# Patient Record
Sex: MALE | Race: WHITE | NOT HISPANIC OR LATINO
[De-identification: names, ages, dates, MRNs, and addresses within clinical notes are randomized per-mention and may not be internally consistent; named-entity substitution may affect disease eponyms.]

---

## 2017-02-15 ENCOUNTER — TRANSCRIPTION ENCOUNTER (OUTPATIENT)
Age: 37
End: 2017-02-15

## 2017-11-06 ENCOUNTER — APPOINTMENT (OUTPATIENT)
Dept: RADIOLOGY | Facility: CLINIC | Age: 37
End: 2017-11-06

## 2017-11-06 ENCOUNTER — OUTPATIENT (OUTPATIENT)
Dept: OUTPATIENT SERVICES | Facility: HOSPITAL | Age: 37
LOS: 1 days | End: 2017-11-06
Payer: COMMERCIAL

## 2017-11-06 DIAGNOSIS — Z00.8 ENCOUNTER FOR OTHER GENERAL EXAMINATION: ICD-10-CM

## 2017-11-06 PROCEDURE — 71020: CPT | Mod: 26

## 2017-11-06 PROCEDURE — 73030 X-RAY EXAM OF SHOULDER: CPT | Mod: 26,RT

## 2017-11-06 PROCEDURE — 73030 X-RAY EXAM OF SHOULDER: CPT

## 2017-11-06 PROCEDURE — 71046 X-RAY EXAM CHEST 2 VIEWS: CPT

## 2017-11-10 DIAGNOSIS — R07.9 CHEST PAIN, UNSPECIFIED: ICD-10-CM

## 2017-11-10 DIAGNOSIS — F17.200 NICOTINE DEPENDENCE, UNSPECIFIED, UNCOMPLICATED: ICD-10-CM

## 2017-11-10 DIAGNOSIS — M25.511 PAIN IN RIGHT SHOULDER: ICD-10-CM

## 2017-12-30 ENCOUNTER — OUTPATIENT (OUTPATIENT)
Dept: OUTPATIENT SERVICES | Facility: HOSPITAL | Age: 37
LOS: 1 days | End: 2017-12-30
Payer: COMMERCIAL

## 2017-12-30 ENCOUNTER — APPOINTMENT (OUTPATIENT)
Dept: MRI IMAGING | Facility: CLINIC | Age: 37
End: 2017-12-30
Payer: COMMERCIAL

## 2017-12-30 DIAGNOSIS — Z00.00 ENCOUNTER FOR GENERAL ADULT MEDICAL EXAMINATION WITHOUT ABNORMAL FINDINGS: ICD-10-CM

## 2017-12-30 PROCEDURE — 73221 MRI JOINT UPR EXTREM W/O DYE: CPT

## 2017-12-30 PROCEDURE — 73221 MRI JOINT UPR EXTREM W/O DYE: CPT | Mod: 26,RT

## 2018-09-28 ENCOUNTER — TRANSCRIPTION ENCOUNTER (OUTPATIENT)
Age: 38
End: 2018-09-28

## 2019-11-15 ENCOUNTER — TRANSCRIPTION ENCOUNTER (OUTPATIENT)
Age: 39
End: 2019-11-15

## 2021-04-26 ENCOUNTER — APPOINTMENT (OUTPATIENT)
Dept: ULTRASOUND IMAGING | Facility: CLINIC | Age: 41
End: 2021-04-26
Payer: COMMERCIAL

## 2021-04-26 ENCOUNTER — OUTPATIENT (OUTPATIENT)
Dept: OUTPATIENT SERVICES | Facility: HOSPITAL | Age: 41
LOS: 1 days | End: 2021-04-26

## 2021-04-26 DIAGNOSIS — Z00.00 ENCOUNTER FOR GENERAL ADULT MEDICAL EXAMINATION WITHOUT ABNORMAL FINDINGS: ICD-10-CM

## 2021-04-26 PROCEDURE — 76770 US EXAM ABDO BACK WALL COMP: CPT | Mod: 26

## 2022-02-14 ENCOUNTER — TRANSCRIPTION ENCOUNTER (OUTPATIENT)
Age: 42
End: 2022-02-14

## 2022-02-15 ENCOUNTER — APPOINTMENT (OUTPATIENT)
Dept: ULTRASOUND IMAGING | Facility: CLINIC | Age: 42
End: 2022-02-15
Payer: COMMERCIAL

## 2022-02-15 ENCOUNTER — OUTPATIENT (OUTPATIENT)
Dept: OUTPATIENT SERVICES | Facility: HOSPITAL | Age: 42
LOS: 1 days | End: 2022-02-15
Payer: COMMERCIAL

## 2022-02-15 DIAGNOSIS — Z00.8 ENCOUNTER FOR OTHER GENERAL EXAMINATION: ICD-10-CM

## 2022-02-15 PROCEDURE — 76882 US LMTD JT/FCL EVL NVASC XTR: CPT | Mod: 26,LT

## 2022-02-15 PROCEDURE — 76882 US LMTD JT/FCL EVL NVASC XTR: CPT

## 2022-03-02 ENCOUNTER — APPOINTMENT (OUTPATIENT)
Dept: ORTHOPEDIC SURGERY | Facility: CLINIC | Age: 42
End: 2022-03-02
Payer: COMMERCIAL

## 2022-03-02 VITALS
SYSTOLIC BLOOD PRESSURE: 142 MMHG | HEIGHT: 71 IN | DIASTOLIC BLOOD PRESSURE: 86 MMHG | HEART RATE: 90 BPM | WEIGHT: 204 LBS | BODY MASS INDEX: 28.56 KG/M2

## 2022-03-02 DIAGNOSIS — M71.20 SYNOVIAL CYST OF POPLITEAL SPACE [BAKER], UNSPECIFIED KNEE: ICD-10-CM

## 2022-03-02 DIAGNOSIS — S83.209A UNSPECIFIED TEAR OF UNSPECIFIED MENISCUS, CURRENT INJURY, UNSPECIFIED KNEE, INITIAL ENCOUNTER: ICD-10-CM

## 2022-03-02 PROCEDURE — 99203 OFFICE O/P NEW LOW 30 MIN: CPT | Mod: 25

## 2022-03-02 PROCEDURE — 20610 DRAIN/INJ JOINT/BURSA W/O US: CPT | Mod: LT

## 2022-03-02 PROCEDURE — 73564 X-RAY EXAM KNEE 4 OR MORE: CPT | Mod: LT

## 2022-03-02 NOTE — DISCUSSION/SUMMARY
[Medication Risks Reviewed] : Medication risks reviewed [Surgical risks reviewed] : Surgical risks reviewed [de-identified] : Patient is a 42-year-old male with a likely meniscus tear in the lateral aspect of his left knee as well as a Baker's cyst.  I recommended conservative treatment at this time.  I gave him injection of cortisone which he tolerated well.  I have also recommended physical therapy.  I have given a prescription for meloxicam 15 mg daily as needed for pain.  I will see him back in 2 months for repeat evaluation.  If no improvement in symptoms we will consider an MRI at that time.  We did discuss the possibility of possible surgical intervention in the future.

## 2022-03-02 NOTE — PHYSICAL EXAM
[de-identified] : GENERAL APPEARANCE: Well nourished and hydrated, pleasant, alert, and oriented x 3. Appears their stated age. \par HEENT: Normocephalic, extraocular eye motion intact. Nasal septum midline. Oral cavity clear. External auditory canal clear. \par RESPIRATORY: Breath sounds clear and audible in all lobes. No wheezing, No accessory muscle use.\par CARDIOVASCULAR: No apparent abnormalities. No lower leg edema. No varicosities. Pedal pulses are palpable.\par NEUROLOGIC: Sensation is normal, no muscle weakness in the upper or lower extremities.\par DERMATOLOGIC: No apparent skin lesions, moist, warm, no rash.\par SPINE: Cervical spine appears normal and moves freely; thoracic spine appears normal and moves freely; lumbosacral spine appears normal and moves freely, normal, nontender.\par MUSCULOSKELETAL: Hands, wrists, and elbows are normal and move freely, shoulders are normal and move freely. \par Psychiatric: Oriented to person, place, and time, insight and judgement were intact and the affect was normal. \par Musculoskeletal:. Left knee exam shows mild effusion, ROM is 0-1 30 degrees, no instability, lateral with Lpue, pain lateral joint line tenderness.  There is a 2 cm Baker's cyst present posteriorly.\par 5/5 motor strength in bilateral lower extremities. Sensory: Intact in bilateral lower extremities. DTRs: Biceps, brachioradialis, triceps, patellar, ankle and plantar 2+ and symmetric bilaterally. Pulses: dorsalis pedis, posterior tibial, femoral, popliteal, and radial 2+ and symmetric bilaterally. \par Constitutional: Alert and in no acute distress, but well-appearing.  [de-identified] : 4 views of the left knee obtained in the office today show no acute fracture or dislocation.  No significant degenerative changes noted.

## 2022-03-02 NOTE — PROCEDURE
[de-identified] : I injected his left knee.\par I discussed at length with the patient the planned steroid and lidocaine injection. The risks, benefits, convalescence and alternatives were reviewed. The possible side effects discussed included but were not limited to: pain, swelling, heat, bleeding, and redness. Symptoms are generally mild but if they are extensive then contact the office. Giving pain relievers by mouth such as NSAIDs or Tylenol can generally treat the reactions to steroid and lidocaine. Rare cases of infection have been noted. Rash, hives and itching may occur post injection. If you have muscle pain or cramps, flushing and or swelling of the face, rapid heart beat, nausea, dizziness, fever, chills, headache, difficulty breathing, swelling in the arms or legs, or have a prickly feeling of your skin, contact a health care provider immediately. Following this discussion, the knee was prepped with Alcohol and under sterile condition the 80 mg Depo-Medrol and 6 cc Lidocaine injection was performed with a 20 gauge needle through a superolateral injection site. The needle was introduced into the joint, aspiration was performed to ensure intra-articular placement and the medication was injected. Upon withdrawal of the needle the site was cleaned with alcohol and a band aid applied. The patient tolerated the injection well and there were no adverse effects. Post injection instructions included no strenuous activity for 24 hours, cryotherapy and if there are any adverse effects to contact the office.\par

## 2022-03-02 NOTE — CONSULT LETTER
[Dear  ___] : Dear  [unfilled], [Consult Letter:] : I had the pleasure of evaluating your patient, [unfilled]. [( Thank you for referring [unfilled] for consultation for _____ )] : Thank you for referring [unfilled] for consultation for [unfilled] [Please see my note below.] : Please see my note below. [Consult Closing:] : Thank you very much for allowing me to participate in the care of this patient.  If you have any questions, please do not hesitate to contact me. [Sincerely,] : Sincerely, [FreeTextEntry2] : ELLY HARDEN\par  [FreeTextEntry3] : Neil Hampton MD\par Orthopaedic Surgery\par Primary/Revision Hip & Knee Orthoplasty\par [facility] [place]\par \par [facility] \par [address] \par [address] \[place] [address]\par \par Tel [phone]\par Fax [phone]\par \par For same day and next day orthopedic appointments contact:\par [email or web address] |[phone] Appointments available nights and weekends!  \par \par [facility] [place]\par Visit us at [email or web address]\par

## 2022-03-02 NOTE — HISTORY OF PRESENT ILLNESS
[Pain Location] : pain [] : left knee [Worsening] : worsening [Intermit.] : ~He/She~ states the symptoms seem to be intermittent [Direct Pressure] : worsened by direct pressure [Walking] : worsened by walking [Knee Flexion] : worsened with knee flexion [Knee Extension] : worsened with knee extension [de-identified] : Mr. MCGEE is a pleasant 42 year old male who presents with Left knee pain. \par 42-year-old male presents today for evaluation of left knee pain and swelling has been going on for the past 3 weeks.  Patient states he was bowling when he began to experience pain in his left knee and then had swelling in the posterior aspect of his knee.  Woke up with a large ball in the back of his knee.  States that over the last 3 weeks it is slowly improved however he still has pain as well as residual weakness in the knee.  Complains of clicking on the lateral aspect of his knee.  Denies any buckling.  Takes NSAIDs as needed for the pain.

## 2022-03-17 ENCOUNTER — APPOINTMENT (OUTPATIENT)
Dept: ORTHOPEDIC SURGERY | Facility: CLINIC | Age: 42
End: 2022-03-17
Payer: COMMERCIAL

## 2022-03-17 VITALS
HEIGHT: 71 IN | HEART RATE: 103 BPM | SYSTOLIC BLOOD PRESSURE: 128 MMHG | DIASTOLIC BLOOD PRESSURE: 79 MMHG | WEIGHT: 204 LBS | BODY MASS INDEX: 28.56 KG/M2

## 2022-03-17 DIAGNOSIS — M89.9 DISORDER OF BONE, UNSPECIFIED: ICD-10-CM

## 2022-03-17 DIAGNOSIS — R20.2 PARESTHESIA OF SKIN: ICD-10-CM

## 2022-03-17 DIAGNOSIS — S63.639D SPRAIN OF INTERPHALANGEAL JOINT OF UNSPECIFIED FINGER, SUBSEQUENT ENCOUNTER: ICD-10-CM

## 2022-03-17 DIAGNOSIS — M79.644 PAIN IN RIGHT FINGER(S): ICD-10-CM

## 2022-03-17 PROCEDURE — 99215 OFFICE O/P EST HI 40 MIN: CPT

## 2022-03-17 PROCEDURE — 73130 X-RAY EXAM OF HAND: CPT | Mod: 50

## 2022-03-28 ENCOUNTER — EMERGENCY (EMERGENCY)
Facility: HOSPITAL | Age: 42
LOS: 1 days | Discharge: HOME | End: 2022-03-28
Attending: STUDENT IN AN ORGANIZED HEALTH CARE EDUCATION/TRAINING PROGRAM
Payer: COMMERCIAL

## 2022-03-28 VITALS
HEART RATE: 120 BPM | SYSTOLIC BLOOD PRESSURE: 164 MMHG | RESPIRATION RATE: 18 BRPM | WEIGHT: 199.96 LBS | DIASTOLIC BLOOD PRESSURE: 94 MMHG | HEIGHT: 71 IN | TEMPERATURE: 98 F | OXYGEN SATURATION: 98 %

## 2022-03-28 PROCEDURE — 73590 X-RAY EXAM OF LOWER LEG: CPT | Mod: 26,RT

## 2022-03-28 PROCEDURE — 73590 X-RAY EXAM OF LOWER LEG: CPT

## 2022-03-28 PROCEDURE — 29515 APPLICATION SHORT LEG SPLINT: CPT

## 2022-03-28 PROCEDURE — 29515 APPLICATION SHORT LEG SPLINT: CPT | Mod: RT

## 2022-03-28 PROCEDURE — 99283 EMERGENCY DEPT VISIT LOW MDM: CPT | Mod: 25

## 2022-03-28 RX ORDER — IBUPROFEN 200 MG
600 TABLET ORAL ONCE
Refills: 0 | Status: COMPLETED | OUTPATIENT
Start: 2022-03-28 | End: 2022-03-28

## 2022-03-28 RX ADMIN — Medication 600 MILLIGRAM(S): at 17:43

## 2022-03-28 NOTE — ED STATDOCS - ATTENDING CONTRIBUTION TO CARE
I have personally performed a face to face medical and diagnostic evaluation of the patient. I have discussed with and reviewed the resident's note and agree with the History, ROS, Physical Exam and MDM unless otherwise indicated. A brief summary of my personal evaluation and impression can be found below.    42yoM no PMH presents with acute onset of RLE pain while walking. No trauma, no rash or change in color. Denies weakness or loss of sensation in leg. PT has been able to ambulate but with difficulty 2/2 pain. Pain worsens with dorsiflexion.  Pt appears well on exam, tender to palpation over right calf with no edema or overlying skin changes. Intact ROM of ankle, sensation intact, good pulse and capillary refill of toes  Xray with no acute fracture. Concern for muscular strain. No suspicion for DVT. Pt was placed in splint for comfort and instructed on strict return precautions and close outpt f/u.

## 2022-03-28 NOTE — ED STATDOCS - CARE PROVIDER_API CALL
Dwayne Ramirez)  Orthopaedic Surgery  [address] [place] [address]  Phone: [phone]  Fax: [phone]  Follow Up Time:

## 2022-03-28 NOTE — ED STATDOCS - PATIENT PORTAL LINK FT
You can access the FollowMyHealth Patient Portal offered by [facility] by registering at the following website: [email or web address]. By joining [employer]’s FollowMyHealth portal, you will also be able to view your health information using other applications (apps) compatible with our system.

## 2022-03-28 NOTE — ED STATDOCS - OBJECTIVE STATEMENT
Patient is a 41yo M presenting with R lower leg pain. He states that he was walking and felt/heard a sudden pop and had extreme pain in the leg. He notes that he can walk with difficulty. Patient states he has no PMH/PSH/meds/allergies.

## 2022-03-28 NOTE — ED PROCEDURE NOTE - CPROC ED POST PROC CARE GUIDE1
Verbal/written post procedure instructions were given to patient/caregiver. Verbal/written post procedure instructions were given to patient/caregiver./Instructed patient/caregiver to follow-up with primary care physician./Elevate the injured extremity as instructed./Keep the cast/splint/dressing clean and dry.

## 2022-03-28 NOTE — ED STATDOCS - NS ED ROS FT
General: Denies fever, chills  MSK: R leg pain  Resp: Denies cough, SOB  CV: Denies CP, palpitations  GI: Denies nausea, vomiting  Neuro: Denies numbness, tingling  Skin: Denies rashes, lacerations

## 2022-03-28 NOTE — ED STATDOCS - NSFOLLOWUPINSTRUCTIONS_ED_ALL_ED_FT
The mainstay of treating orthopedic injuries is known as RICE therapy  Rest the joint  Ice the joint  Compress the joint  Elevate the joint  Use Ibuprofen 600mg 2-3 times a day for the next 3 days  and follow up with the orthopedist

## 2022-03-28 NOTE — ED STATDOCS - CLINICAL SUMMARY MEDICAL DECISION MAKING FREE TEXT BOX
Patient presenting with possible calf rupture. Xrays showing no fractures or abnormalities. Will splint in plantarflexion, give motrin, and ortho follow up.

## 2022-03-28 NOTE — ED STATDOCS - PHYSICAL EXAMINATION
Gen: Well appearing in NAD  Head: NC/AT  Neck: Trachea midline  Resp: No distress  Ext: R calf muscle TTP, pain with dorsiflexion  Neuro: A&O appears non focal  Skin: Warm and dry as visualized  Psych: Normal affect and mood

## 2022-03-30 ENCOUNTER — NON-APPOINTMENT (OUTPATIENT)
Age: 42
End: 2022-03-30

## 2022-03-30 ENCOUNTER — APPOINTMENT (OUTPATIENT)
Dept: ORTHOPEDIC SURGERY | Facility: CLINIC | Age: 42
End: 2022-03-30
Payer: COMMERCIAL

## 2022-03-30 DIAGNOSIS — S86.811A STRAIN OF OTHER MUSCLE(S) AND TENDON(S) AT LOWER LEG LEVEL, RIGHT LEG, INITIAL ENCOUNTER: ICD-10-CM

## 2022-03-30 PROCEDURE — 97760 ORTHOTIC MGMT&TRAING 1ST ENC: CPT

## 2022-03-30 PROCEDURE — 99214 OFFICE O/P EST MOD 30 MIN: CPT

## 2022-03-30 NOTE — PHYSICAL EXAM
[de-identified] : Right leg physical exam:\par \par No erythema, warmth, rubor.  No signs of infection in the right leg.  Normal knee range of motion and ankle range of motion.  Positive pain mid belly medial gastroc muscle.  Negative Barron squeeze test.  Achilles tendon is intact.  Neurovascularly intact right lower extremity. [de-identified] : ACC: [id number] EXAM: XR TIB FIB AP LAT 2 VIEWS RT\par \par PROCEDURE DATE: 03/28/2022\par \par \par \par INTERPRETATION: Radiographs of the RIGHT tibia and fibula\par \par CLINICAL INFORMATION: RIGHT lower extremity Pain.\par \par TECHNIQUE: Frontal and lateral views of the tibia and fibula were obtained.\par \par FINDINGS: No prior studies are available for review.\par \par The tibia and fibula are intact.\par No fracture or gross osseous lytic/ blastic lesion..\par No soft tissue abnormality.\par \par IMPRESSION: No acute radiographic osseous pathology..\par \par --- End of Report ---\par \par \par \par \par \par ABHISHEK CHAPPELL MD; Attending Radiologist\par This document has been electronically signed. Mar 29 2022 8:36AM

## 2022-03-30 NOTE — HISTORY OF PRESENT ILLNESS
[FreeTextEntry1] : The patient is a 42-year-old male who presents with a right calf injury which he sustained less than 1 week ago while crossing the street.  He felt a pop in the right calf.  He presents using crutches and in a protective splint given to him by [facility] emergency department.  He denies fevers, chills, night sweats, shortness of breath.  His pain in the calf today is a 6 out of 10.  He cannot walk normally without his crutches.  No other complaints.

## 2022-03-30 NOTE — DISCUSSION/SUMMARY
[de-identified] : Assessment: Right calf strain\par \par Plan:\par 1. Physical Therapy.  Long cam boot given to wear for the next 2 to 3 weeks.  Weight-bear as tolerated.  Come out of the boot when he starts to feel better.\par 2. NSAIDs/Tylenol as needed for pain.  Aspirin given 325 mg daily for the next 2 to 3 weeks while he is in the boot to protect from DVT.\par 3. Return to normal activities as tolerated. \par 4. Continue with ice and heat therapy. \par 5. All questions answered.  Follow-up as needed. If pain persists consider advanced imaging in the future.  The patient understood the treatment plan.

## 2022-03-31 ENCOUNTER — OUTPATIENT (OUTPATIENT)
Dept: OUTPATIENT SERVICES | Facility: HOSPITAL | Age: 42
LOS: 1 days | End: 2022-03-31
Payer: COMMERCIAL

## 2022-03-31 ENCOUNTER — APPOINTMENT (OUTPATIENT)
Dept: ULTRASOUND IMAGING | Facility: CLINIC | Age: 42
End: 2022-03-31
Payer: COMMERCIAL

## 2022-03-31 DIAGNOSIS — S86.811A STRAIN OF OTHER MUSCLE(S) AND TENDON(S) AT LOWER LEG LEVEL, RIGHT LEG, INITIAL ENCOUNTER: ICD-10-CM

## 2022-03-31 PROCEDURE — 93971 EXTREMITY STUDY: CPT

## 2022-03-31 PROCEDURE — 93971 EXTREMITY STUDY: CPT | Mod: 26,RT

## 2022-04-13 ENCOUNTER — APPOINTMENT (OUTPATIENT)
Dept: ORTHOPEDIC SURGERY | Facility: CLINIC | Age: 42
End: 2022-04-13

## 2022-05-09 ENCOUNTER — APPOINTMENT (OUTPATIENT)
Dept: ULTRASOUND IMAGING | Facility: CLINIC | Age: 42
End: 2022-05-09

## 2022-05-10 ENCOUNTER — APPOINTMENT (OUTPATIENT)
Dept: VASCULAR SURGERY | Facility: CLINIC | Age: 42
End: 2022-05-10
Payer: COMMERCIAL

## 2022-05-10 VITALS
HEART RATE: 99 BPM | OXYGEN SATURATION: 95 % | RESPIRATION RATE: 15 BRPM | DIASTOLIC BLOOD PRESSURE: 85 MMHG | SYSTOLIC BLOOD PRESSURE: 130 MMHG | HEIGHT: 70 IN | BODY MASS INDEX: 27.77 KG/M2 | WEIGHT: 194 LBS | TEMPERATURE: 97.3 F

## 2022-05-10 PROCEDURE — 99203 OFFICE O/P NEW LOW 30 MIN: CPT

## 2022-05-10 PROCEDURE — 93970 EXTREMITY STUDY: CPT

## 2022-05-10 RX ORDER — MELOXICAM 15 MG/1
15 TABLET ORAL
Qty: 30 | Refills: 0 | Status: DISCONTINUED | COMMUNITY
Start: 2022-03-30 | End: 2022-05-10

## 2022-05-10 RX ORDER — MELOXICAM 15 MG/1
15 TABLET ORAL
Qty: 30 | Refills: 0 | Status: DISCONTINUED | COMMUNITY
Start: 2022-03-02 | End: 2022-05-10

## 2022-05-10 NOTE — ASSESSMENT
[FreeTextEntry1] : 41 yo male with acute occlusive left gastroc DVT and acute non-occlusive popliteal DVT. Pt recently was using immobilizer boot for right calf muscle injury. \par \par Pt counseled on results of duplex and above diagnosis.\par Will start pt on 15 mg Xarelto BID for 3 weeks. After 3 weeks take Xarelto 20 mg once daily. \par Pt counseled if he has any SOB, chest pain or difficulty breathing he should go to the ER \par RTC in 4 weeks for repeat venous duplex to monitor DVT \par \par A total of 35 minutes was spent with patient and coordinating care.\par

## 2022-05-10 NOTE — HISTORY OF PRESENT ILLNESS
[FreeTextEntry1] : New 41 yo male PMHx degenerative disc disease, HTN, HLD, presents for evaluation of right leg pain and bulging varicose veins. Pt states 4-5 days ago he noticed the right medial knee and thigh pulling sensation and large varicose veins in the calf. He injured his calf muscle a few weeks ago and was wearing an immobilizer boot for a few weeks. Pt had a right GSV RFA about 5 years ago for varicose veins. Pt denies any severe leg swelling, chest pain, SOB, difficulty breathing. He denies hx of DVT.

## 2022-05-10 NOTE — PROCEDURE
[FreeTextEntry1] : Studies: \par \par 5/10/22 BLE venous duplex: \par right: acute occlusive gastroc DVT. Acute non-occlusive popliteal DVT \par left: GSV enlarged with > 2 sec reflux. No DVT

## 2022-05-10 NOTE — PHYSICAL EXAM
[2+] : left 2+ [Alert] : alert [Oriented to Person] : oriented to person [Oriented to Place] : oriented to place [Oriented to Time] : oriented to time [Calm] : calm [Ankle Swelling (On Exam)] : present [Varicose Veins Of Lower Extremities] : present [Varicose Veins Of The Right Leg] : of the right leg [Ankle Swelling On The Right] : mild [] : not present [Skin Ulcer] : no ulcer [de-identified] : NAD. Well appearing  [FreeTextEntry1] : palpable cord in right medial thigh and knee. Right calf varicose veins noted.

## 2022-06-07 ENCOUNTER — APPOINTMENT (OUTPATIENT)
Dept: VASCULAR SURGERY | Facility: CLINIC | Age: 42
End: 2022-06-07
Payer: COMMERCIAL

## 2022-06-07 VITALS
TEMPERATURE: 97.7 F | WEIGHT: 194 LBS | OXYGEN SATURATION: 94 % | HEIGHT: 70 IN | BODY MASS INDEX: 27.77 KG/M2 | SYSTOLIC BLOOD PRESSURE: 138 MMHG | DIASTOLIC BLOOD PRESSURE: 86 MMHG | HEART RATE: 100 BPM

## 2022-06-07 PROCEDURE — 93971 EXTREMITY STUDY: CPT

## 2022-06-07 PROCEDURE — 99213 OFFICE O/P EST LOW 20 MIN: CPT

## 2022-06-07 NOTE — PROCEDURE
[FreeTextEntry1] : Studies: \par \par 5/10/22 BLE venous duplex: \par right: acute occlusive gastroc DVT. Acute non-occlusive popliteal DVT \par left: GSV enlarged with > 2 sec reflux. No DVT \par \par 6/7/22 RLE venous duplex: subacute/ chronic non-occlusive gastroc DVT. Popliteal DVT resolved. SVT in tributary vein of calf.

## 2022-06-07 NOTE — HISTORY OF PRESENT ILLNESS
[FreeTextEntry1] : 5/10/22: New 41 yo male PMHx degenerative disc disease, HTN, HLD, presents for evaluation of right leg pain and bulging varicose veins. Pt states 4-5 days ago he noticed the right medial knee and thigh pulling sensation and large varicose veins in the calf. He injured his calf muscle a few weeks ago and was wearing an immobilizer boot for a few weeks. Pt had a right GSV RFA about 5 years ago for varicose veins. Pt denies any severe leg swelling, chest pain, SOB, difficulty breathing. He denies hx of DVT. \par \par 6/7/22: Pt doing well since last visit. He has been compliant with Xarelto 20 mg once daily. He feels the pain in his right leg is generally improving. Some days he still has the pulling sensation in his right medial thigh. He denies any SOB, chest pain, fever or chills.

## 2022-06-07 NOTE — ASSESSMENT
[FreeTextEntry1] : 43 yo male with sub acute/ chronic non occlusive left gastroc DVT. Popliteal DVT has resolced Pt recently was using immobilizer boot for right calf muscle injury. \par \par Pt counseled on results of duplex and above diagnosis.\par Pt counseled to continue taking Xarelto 20 mg daily \par Pt counseled if he has any SOB, chest pain or difficulty breathing he should go to the ER \par RTC in 3 months for repeat venous duplex to monitor DVT \par \par A total of 25 minutes was spent with patient and coordinating care.\par

## 2022-06-07 NOTE — PHYSICAL EXAM
[2+] : left 2+ [Ankle Swelling (On Exam)] : present [Varicose Veins Of Lower Extremities] : present [Varicose Veins Of The Right Leg] : of the right leg [Ankle Swelling On The Right] : mild [Alert] : alert [Oriented to Person] : oriented to person [Oriented to Place] : oriented to place [Oriented to Time] : oriented to time [] : not present [Skin Ulcer] : no ulcer [Anxious] : anxious [de-identified] : NAD. Well appearing  [FreeTextEntry1] : palpable cord in right medial thigh and knee. Right calf varicose veins noted.

## 2022-09-13 ENCOUNTER — APPOINTMENT (OUTPATIENT)
Dept: VASCULAR SURGERY | Facility: CLINIC | Age: 42
End: 2022-09-13

## 2022-09-13 DIAGNOSIS — I82.561 RT CALF MUSCULAR VEIN CHRONIC EMBOLISM AND THROMBOSIS: ICD-10-CM

## 2022-09-13 PROCEDURE — 93970 EXTREMITY STUDY: CPT

## 2022-09-13 PROCEDURE — 99213 OFFICE O/P EST LOW 20 MIN: CPT

## 2022-10-12 ENCOUNTER — EMERGENCY (EMERGENCY)
Facility: HOSPITAL | Age: 42
LOS: 1 days | Discharge: LEFT BEFORE DISCHARGE | End: 2022-10-12
Attending: EMERGENCY MEDICINE
Payer: COMMERCIAL

## 2022-10-12 VITALS
HEIGHT: 71 IN | WEIGHT: 197.98 LBS | TEMPERATURE: 98 F | RESPIRATION RATE: 18 BRPM | SYSTOLIC BLOOD PRESSURE: 140 MMHG | HEART RATE: 130 BPM | DIASTOLIC BLOOD PRESSURE: 89 MMHG | OXYGEN SATURATION: 96 %

## 2022-10-12 PROCEDURE — 99283 EMERGENCY DEPT VISIT LOW MDM: CPT

## 2022-10-12 RX ORDER — AMPICILLIN SODIUM AND SULBACTAM SODIUM 250; 125 MG/ML; MG/ML
INJECTION, POWDER, FOR SUSPENSION INTRAMUSCULAR; INTRAVENOUS
Refills: 0 | Status: DISCONTINUED | OUTPATIENT
Start: 2022-10-12 | End: 2022-10-17

## 2022-10-12 RX ORDER — AMPICILLIN SODIUM AND SULBACTAM SODIUM 250; 125 MG/ML; MG/ML
3 INJECTION, POWDER, FOR SUSPENSION INTRAMUSCULAR; INTRAVENOUS ONCE
Refills: 0 | Status: DISCONTINUED | OUTPATIENT
Start: 2022-10-12 | End: 2022-10-17

## 2022-10-12 RX ORDER — AMPICILLIN SODIUM AND SULBACTAM SODIUM 250; 125 MG/ML; MG/ML
3 INJECTION, POWDER, FOR SUSPENSION INTRAMUSCULAR; INTRAVENOUS EVERY 6 HOURS
Refills: 0 | Status: DISCONTINUED | OUTPATIENT
Start: 2022-10-13 | End: 2022-10-17

## 2022-10-12 NOTE — ED PROVIDER NOTE - CLINICAL SUMMARY MEDICAL DECISION MAKING FREE TEXT BOX
42M with erythema and edema adjacent to new tattoo. 42M with erythema and edema adjacent to new tattoo. Extensive cellulitis. Ordered labs, IV abx, US to r/o dvt given recent LE dvt.   Pt states he cannot stay for workup. I had a lengthy discussion with patient, and the patient wishes to leave at this time. The patient understands that he/she is leaving against medical advice despite the risk of missing a potential serious diagnosis which may lead to injury, disability and/or death. I discussed with the patient which tests would need to be performed and what type of monitoring would be necessary for the patient as well. I was unable to convince the patient to stay for further work-up. The patient is alert and oriented and demonstrates competence in making medical decisions.   Rx for po abx sent and urged pt to FU with PCP or return to ED ASAP for further workup and treatment.

## 2022-10-12 NOTE — ED PROVIDER NOTE - OBJECTIVE STATEMENT
42M with no PMH presenting with left arm pain and swelling after new tattoo yesterday night. Pt states he has gotten all his tattoos from the same location and same artist without any reactions in the past. Pt woke up with some redness and swelling in the arm and it worsened throughout the day.   Of note, recent blood clot LLE after torn calf muscle and taken off xarelto 9/2022. 42M with no PMH presenting with left arm pain and swelling after new tattoo yesterday night. Pt states he has gotten all his tattoos from the same shop and same artist without any reactions in the past. Pt woke up with redness and swelling in the arm and it worsened throughout the day. Denies fever, chills, draining from the site.   Of note, recent DVT LLE after torn calf muscle and taken off xarelto 9/2022 when repeat imaging showed improvement.

## 2022-10-12 NOTE — ED PROVIDER NOTE - ATTENDING APP SHARED VISIT CONTRIBUTION OF CARE
pot tattoo cellulitis with erythema warmth and swelling to LUE after tattoo placed yesterday  pe as doc  agree w iv iv ab labs meds doppler to r/o dvt( hx dvt) reassess

## 2022-10-12 NOTE — ED PROVIDER NOTE - NS ED ATTENDING STATEMENT MOD
This was a shared visit with the AVTAR. I reviewed and verified the documentation and independently performed the documented:

## 2022-10-12 NOTE — ED ADULT TRIAGE NOTE - CHIEF COMPLAINT QUOTE
Ambulatory reporting getting a new tattoo yesterday and waking up with his arm to swollen, warm  to touch. Denies fevers. Took Naproxen @1800 without any relief.

## 2022-10-12 NOTE — ED PROVIDER NOTE - PATIENT PORTAL LINK FT
You can access the FollowMyHealth Patient Portal offered by [facility] by registering at the following website: [email or web address]. By joining [employer]’s FollowMyHealth portal, you will also be able to view your health information using other applications (apps) compatible with our system.

## 2022-10-13 PROCEDURE — 99282 EMERGENCY DEPT VISIT SF MDM: CPT

## 2022-11-08 ENCOUNTER — APPOINTMENT (OUTPATIENT)
Dept: VASCULAR SURGERY | Facility: CLINIC | Age: 42
End: 2022-11-08

## 2022-11-08 NOTE — HISTORY OF PRESENT ILLNESS
[FreeTextEntry1] : 5/10/22: New 41 yo male PMHx degenerative disc disease, HTN, HLD, presents for evaluation of right leg pain and bulging varicose veins. Pt states 4-5 days ago he noticed the right medial knee and thigh pulling sensation and large varicose veins in the calf. He injured his calf muscle a few weeks ago and was wearing an immobilizer boot for a few weeks. Pt had a right GSV RFA about 5 years ago for varicose veins. Pt denies any severe leg swelling, chest pain, SOB, difficulty breathing. He denies hx of DVT. \par \par 6/7/22: Pt doing well since last visit. He has been compliant with Xarelto 20 mg once daily. He feels the pain in his right leg is generally improving. Some days he still has the pulling sensation in his right medial thigh. He denies any SOB, chest pain, fever or chills. \par \par 9/13/22: Pt doing well since last visit. He has been compliant with Xarelto 20 mg once daily. His right leg has been feeling good. He has noticed his ankles bilaterally are becoming darker. He has a noticeable right calf varicose vein. He has some pain in his left medial thigh. Pt admits he has been working long hours recently ( and ). He denies any SOB, chest pain, fever or chills.

## 2022-11-08 NOTE — ASSESSMENT
[FreeTextEntry1] : 41 yo male with chronic non occlusive right gastroc DVT. Popliteal DVT has resolved. Pt recently was using immobilizer boot for right calf muscle injury. Pt also has left GSV insufficiency. Bilateral medial ankle hyperpigmentation. \par \par Pt counseled on results of duplex and above diagnosis.\par Pt counseled to continue taking Xarelto 20 mg daily \par Pt counseled if he has any SOB, chest pain or difficulty breathing he should go to the ER \par RTC in November for repeat venous duplex to monitor DVT \par \par A total of 25 minutes was spent with patient and coordinating care.\par

## 2022-11-08 NOTE — PHYSICAL EXAM
[2+] : left 2+ [Ankle Swelling (On Exam)] : present [Varicose Veins Of Lower Extremities] : present [Varicose Veins Of The Right Leg] : of the right leg [Alert] : alert [Oriented to Person] : oriented to person [Oriented to Place] : oriented to place [Oriented to Time] : oriented to time [Anxious] : anxious [] : bilaterally [Ankle Swelling On The Right] : mild [Skin Ulcer] : no ulcer [de-identified] : NAD. Well appearing  [FreeTextEntry1] : palpable cord in right medial thigh and knee. Right calf varicose veins noted. \par several spider veins noted in medial ankles bilaterally

## 2022-11-08 NOTE — PROCEDURE
[FreeTextEntry1] : Studies: \par \par 5/10/22 BLE venous duplex: \par right: acute occlusive gastroc DVT. Acute non-occlusive popliteal DVT \par left: GSV enlarged with > 2 sec reflux. No DVT \par \par 6/7/22 RLE venous duplex: subacute/ chronic non-occlusive gastroc DVT. Popliteal DVT resolved. SVT in tributary vein of calf. \par \par 9/13/22 BLE venous duplex: \par right: Chronic non-occlusive gastroc DVT. GSV enlarged below knee with > 1 sec reflux. \par left: GSV enlarged with > 0.5 sec reflux. No DVT

## 2023-11-06 ENCOUNTER — APPOINTMENT (OUTPATIENT)
Dept: ORTHOPEDIC SURGERY | Facility: CLINIC | Age: 43
End: 2023-11-06
Payer: COMMERCIAL

## 2023-11-06 VITALS
HEART RATE: 93 BPM | WEIGHT: 201 LBS | SYSTOLIC BLOOD PRESSURE: 144 MMHG | BODY MASS INDEX: 28.14 KG/M2 | DIASTOLIC BLOOD PRESSURE: 92 MMHG | HEIGHT: 71 IN

## 2023-11-06 PROCEDURE — 99213 OFFICE O/P EST LOW 20 MIN: CPT

## 2023-11-06 PROCEDURE — 73030 X-RAY EXAM OF SHOULDER: CPT | Mod: LT

## 2023-11-08 ENCOUNTER — APPOINTMENT (OUTPATIENT)
Dept: MRI IMAGING | Facility: CLINIC | Age: 43
End: 2023-11-08

## 2023-11-08 ENCOUNTER — OUTPATIENT (OUTPATIENT)
Dept: OUTPATIENT SERVICES | Facility: HOSPITAL | Age: 43
LOS: 1 days | End: 2023-11-08
Payer: COMMERCIAL

## 2023-11-08 DIAGNOSIS — M25.512 PAIN IN LEFT SHOULDER: ICD-10-CM

## 2023-11-08 PROCEDURE — 73221 MRI JOINT UPR EXTREM W/O DYE: CPT | Mod: 26,LT

## 2023-11-08 PROCEDURE — 73221 MRI JOINT UPR EXTREM W/O DYE: CPT

## 2023-11-08 PROCEDURE — 73218 MRI UPPER EXTREMITY W/O DYE: CPT | Mod: 26,LT

## 2023-11-08 PROCEDURE — 73218 MRI UPPER EXTREMITY W/O DYE: CPT

## 2023-11-09 ENCOUNTER — APPOINTMENT (OUTPATIENT)
Dept: ORTHOPEDIC SURGERY | Facility: CLINIC | Age: 43
End: 2023-11-09
Payer: COMMERCIAL

## 2023-11-09 VITALS
HEIGHT: 71 IN | WEIGHT: 200 LBS | DIASTOLIC BLOOD PRESSURE: 88 MMHG | HEART RATE: 102 BPM | BODY MASS INDEX: 28 KG/M2 | SYSTOLIC BLOOD PRESSURE: 128 MMHG

## 2023-11-09 PROCEDURE — 99214 OFFICE O/P EST MOD 30 MIN: CPT

## 2023-11-13 ENCOUNTER — OUTPATIENT (OUTPATIENT)
Dept: OUTPATIENT SERVICES | Facility: HOSPITAL | Age: 43
LOS: 1 days | End: 2023-11-13
Payer: COMMERCIAL

## 2023-11-13 VITALS
RESPIRATION RATE: 18 BRPM | TEMPERATURE: 98 F | DIASTOLIC BLOOD PRESSURE: 80 MMHG | WEIGHT: 182.98 LBS | SYSTOLIC BLOOD PRESSURE: 125 MMHG | HEIGHT: 71 IN | OXYGEN SATURATION: 98 % | HEART RATE: 73 BPM

## 2023-11-13 DIAGNOSIS — M25.512 PAIN IN LEFT SHOULDER: ICD-10-CM

## 2023-11-13 DIAGNOSIS — Z91.89 OTHER SPECIFIED PERSONAL RISK FACTORS, NOT ELSEWHERE CLASSIFIED: ICD-10-CM

## 2023-11-13 DIAGNOSIS — Z98.890 OTHER SPECIFIED POSTPROCEDURAL STATES: Chronic | ICD-10-CM

## 2023-11-13 DIAGNOSIS — F19.91 OTHER PSYCHOACTIVE SUBSTANCE USE, UNSPECIFIED, IN REMISSION: ICD-10-CM

## 2023-11-13 DIAGNOSIS — I82.401 ACUTE EMBOLISM AND THROMBOSIS OF UNSPECIFIED DEEP VEINS OF RIGHT LOWER EXTREMITY: ICD-10-CM

## 2023-11-13 DIAGNOSIS — Z01.818 ENCOUNTER FOR OTHER PREPROCEDURAL EXAMINATION: ICD-10-CM

## 2023-11-13 LAB
A1C WITH ESTIMATED AVERAGE GLUCOSE RESULT: 5.7 % — HIGH (ref 4–5.6)
A1C WITH ESTIMATED AVERAGE GLUCOSE RESULT: 5.7 % — HIGH (ref 4–5.6)
ANION GAP SERPL CALC-SCNC: 14 MMOL/L — SIGNIFICANT CHANGE UP (ref 5–17)
ANION GAP SERPL CALC-SCNC: 14 MMOL/L — SIGNIFICANT CHANGE UP (ref 5–17)
APTT BLD: 28.8 SEC — SIGNIFICANT CHANGE UP (ref 24.5–35.6)
APTT BLD: 28.8 SEC — SIGNIFICANT CHANGE UP (ref 24.5–35.6)
BASOPHILS # BLD AUTO: 0.06 K/UL — SIGNIFICANT CHANGE UP (ref 0–0.2)
BASOPHILS # BLD AUTO: 0.06 K/UL — SIGNIFICANT CHANGE UP (ref 0–0.2)
BASOPHILS NFR BLD AUTO: 1 % — SIGNIFICANT CHANGE UP (ref 0–2)
BASOPHILS NFR BLD AUTO: 1 % — SIGNIFICANT CHANGE UP (ref 0–2)
BLD GP AB SCN SERPL QL: SIGNIFICANT CHANGE UP
BLD GP AB SCN SERPL QL: SIGNIFICANT CHANGE UP
BUN SERPL-MCNC: 13.4 MG/DL — SIGNIFICANT CHANGE UP (ref 8–20)
BUN SERPL-MCNC: 13.4 MG/DL — SIGNIFICANT CHANGE UP (ref 8–20)
CALCIUM SERPL-MCNC: 9.1 MG/DL — SIGNIFICANT CHANGE UP (ref 8.4–10.5)
CALCIUM SERPL-MCNC: 9.1 MG/DL — SIGNIFICANT CHANGE UP (ref 8.4–10.5)
CHLORIDE SERPL-SCNC: 103 MMOL/L — SIGNIFICANT CHANGE UP (ref 96–108)
CHLORIDE SERPL-SCNC: 103 MMOL/L — SIGNIFICANT CHANGE UP (ref 96–108)
CO2 SERPL-SCNC: 27 MMOL/L — SIGNIFICANT CHANGE UP (ref 22–29)
CO2 SERPL-SCNC: 27 MMOL/L — SIGNIFICANT CHANGE UP (ref 22–29)
CREAT SERPL-MCNC: 0.84 MG/DL — SIGNIFICANT CHANGE UP (ref 0.5–1.3)
CREAT SERPL-MCNC: 0.84 MG/DL — SIGNIFICANT CHANGE UP (ref 0.5–1.3)
EGFR: 110 ML/MIN/1.73M2 — SIGNIFICANT CHANGE UP
EGFR: 110 ML/MIN/1.73M2 — SIGNIFICANT CHANGE UP
EOSINOPHIL # BLD AUTO: 0.33 K/UL — SIGNIFICANT CHANGE UP (ref 0–0.5)
EOSINOPHIL # BLD AUTO: 0.33 K/UL — SIGNIFICANT CHANGE UP (ref 0–0.5)
EOSINOPHIL NFR BLD AUTO: 5.6 % — SIGNIFICANT CHANGE UP (ref 0–6)
EOSINOPHIL NFR BLD AUTO: 5.6 % — SIGNIFICANT CHANGE UP (ref 0–6)
ESTIMATED AVERAGE GLUCOSE: 117 MG/DL — HIGH (ref 68–114)
ESTIMATED AVERAGE GLUCOSE: 117 MG/DL — HIGH (ref 68–114)
GLUCOSE SERPL-MCNC: 86 MG/DL — SIGNIFICANT CHANGE UP (ref 70–99)
GLUCOSE SERPL-MCNC: 86 MG/DL — SIGNIFICANT CHANGE UP (ref 70–99)
HCT VFR BLD CALC: 49.4 % — SIGNIFICANT CHANGE UP (ref 39–50)
HCT VFR BLD CALC: 49.4 % — SIGNIFICANT CHANGE UP (ref 39–50)
HGB BLD-MCNC: 15.7 G/DL — SIGNIFICANT CHANGE UP (ref 13–17)
HGB BLD-MCNC: 15.7 G/DL — SIGNIFICANT CHANGE UP (ref 13–17)
IMM GRANULOCYTES NFR BLD AUTO: 0.3 % — SIGNIFICANT CHANGE UP (ref 0–0.9)
IMM GRANULOCYTES NFR BLD AUTO: 0.3 % — SIGNIFICANT CHANGE UP (ref 0–0.9)
INR BLD: 0.85 RATIO — SIGNIFICANT CHANGE UP (ref 0.85–1.18)
INR BLD: 0.85 RATIO — SIGNIFICANT CHANGE UP (ref 0.85–1.18)
LYMPHOCYTES # BLD AUTO: 1.24 K/UL — SIGNIFICANT CHANGE UP (ref 1–3.3)
LYMPHOCYTES # BLD AUTO: 1.24 K/UL — SIGNIFICANT CHANGE UP (ref 1–3.3)
LYMPHOCYTES # BLD AUTO: 21 % — SIGNIFICANT CHANGE UP (ref 13–44)
LYMPHOCYTES # BLD AUTO: 21 % — SIGNIFICANT CHANGE UP (ref 13–44)
MCHC RBC-ENTMCNC: 29.5 PG — SIGNIFICANT CHANGE UP (ref 27–34)
MCHC RBC-ENTMCNC: 29.5 PG — SIGNIFICANT CHANGE UP (ref 27–34)
MCHC RBC-ENTMCNC: 31.8 GM/DL — LOW (ref 32–36)
MCHC RBC-ENTMCNC: 31.8 GM/DL — LOW (ref 32–36)
MCV RBC AUTO: 92.7 FL — SIGNIFICANT CHANGE UP (ref 80–100)
MCV RBC AUTO: 92.7 FL — SIGNIFICANT CHANGE UP (ref 80–100)
MONOCYTES # BLD AUTO: 0.66 K/UL — SIGNIFICANT CHANGE UP (ref 0–0.9)
MONOCYTES # BLD AUTO: 0.66 K/UL — SIGNIFICANT CHANGE UP (ref 0–0.9)
MONOCYTES NFR BLD AUTO: 11.2 % — SIGNIFICANT CHANGE UP (ref 2–14)
MONOCYTES NFR BLD AUTO: 11.2 % — SIGNIFICANT CHANGE UP (ref 2–14)
NEUTROPHILS # BLD AUTO: 3.6 K/UL — SIGNIFICANT CHANGE UP (ref 1.8–7.4)
NEUTROPHILS # BLD AUTO: 3.6 K/UL — SIGNIFICANT CHANGE UP (ref 1.8–7.4)
NEUTROPHILS NFR BLD AUTO: 60.9 % — SIGNIFICANT CHANGE UP (ref 43–77)
NEUTROPHILS NFR BLD AUTO: 60.9 % — SIGNIFICANT CHANGE UP (ref 43–77)
PLATELET # BLD AUTO: 315 K/UL — SIGNIFICANT CHANGE UP (ref 150–400)
PLATELET # BLD AUTO: 315 K/UL — SIGNIFICANT CHANGE UP (ref 150–400)
POTASSIUM SERPL-MCNC: 4.2 MMOL/L — SIGNIFICANT CHANGE UP (ref 3.5–5.3)
POTASSIUM SERPL-MCNC: 4.2 MMOL/L — SIGNIFICANT CHANGE UP (ref 3.5–5.3)
POTASSIUM SERPL-SCNC: 4.2 MMOL/L — SIGNIFICANT CHANGE UP (ref 3.5–5.3)
POTASSIUM SERPL-SCNC: 4.2 MMOL/L — SIGNIFICANT CHANGE UP (ref 3.5–5.3)
PROTHROM AB SERPL-ACNC: 9.5 SEC — SIGNIFICANT CHANGE UP (ref 9.5–13)
PROTHROM AB SERPL-ACNC: 9.5 SEC — SIGNIFICANT CHANGE UP (ref 9.5–13)
RBC # BLD: 5.33 M/UL — SIGNIFICANT CHANGE UP (ref 4.2–5.8)
RBC # BLD: 5.33 M/UL — SIGNIFICANT CHANGE UP (ref 4.2–5.8)
RBC # FLD: 12.7 % — SIGNIFICANT CHANGE UP (ref 10.3–14.5)
RBC # FLD: 12.7 % — SIGNIFICANT CHANGE UP (ref 10.3–14.5)
SODIUM SERPL-SCNC: 144 MMOL/L — SIGNIFICANT CHANGE UP (ref 135–145)
SODIUM SERPL-SCNC: 144 MMOL/L — SIGNIFICANT CHANGE UP (ref 135–145)
WBC # BLD: 5.91 K/UL — SIGNIFICANT CHANGE UP (ref 3.8–10.5)
WBC # BLD: 5.91 K/UL — SIGNIFICANT CHANGE UP (ref 3.8–10.5)
WBC # FLD AUTO: 5.91 K/UL — SIGNIFICANT CHANGE UP (ref 3.8–10.5)
WBC # FLD AUTO: 5.91 K/UL — SIGNIFICANT CHANGE UP (ref 3.8–10.5)

## 2023-11-13 PROCEDURE — G0463: CPT

## 2023-11-13 NOTE — H&P PST ADULT - ASSESSMENT
Patient educated on surgical scrub, preadmission instructions, ERP protocol, medical clearance and day of procedure medications, verbalizes understanding. Pt instructed to stop vitamins/supplements/herbal medications/ASA/NSAIDS for one week prior to surgery and discuss with PMD.     CAPRINI SCORE    AGE RELATED RISK FACTORS                                                             [ ] Age 41-60 years                                            (1 Point)  [ ] Age: 61-74 years                                           (2 Points)                 [ ] Age= 75 years                                                (3 Points)             DISEASE RELATED RISK FACTORS                                                       [ ] Edema in the lower extremities                 (1 Point)                     [ ] Varicose veins                                               (1 Point)                                 [ ] BMI > 25 Kg/m2                                            (1 Point)                                  [ ] Serious infection (ie PNA)                            (1 Point)                     [ ] Lung disease ( COPD, Emphysema)            (1 Point)                                                                          [ ] Acute myocardial infarction                         (1 Point)                  [ ] Congestive heart failure (in the previous month)  (1 Point)         [ ] Inflammatory bowel disease                            (1 Point)                  [ ] Central venous access, PICC or Port               (2 points)       (within the last month)                                                                [ ] Stroke (in the previous month)                        (5 Points)    [ ] Previous or present malignancy                       (2 points)                                                                                                                                                         HEMATOLOGY RELATED FACTORS                                                         [ ] Prior episodes of VTE                                     (3 Points)                     [ ] Positive family history for VTE                      (3 Points)                  [ ] Prothrombin 84342 A                                     (3 Points)                     [ ] Factor V Leiden                                                (3 Points)                        [ ] Lupus anticoagulants                                      (3 Points)                                                           [ ] Anticardiolipin antibodies                              (3 Points)                                                       [ ] High homocysteine in the blood                   (3 Points)                                             [ ] Other congenital or acquired thrombophilia      (3 Points)                                                [ ] Heparin induced thrombocytopenia                  (3 Points)                                        MOBILITY RELATED FACTORS  [ ] Bed rest                                                         (1 Point)  [ ] Plaster cast                                                    (2 points)  [ ] Bed bound for more than 72 hours           (2 Points)    GENDER SPECIFIC FACTORS  [ ] Pregnancy or had a baby within the last month   (1 Point)  [ ] Post-partum < 6 weeks                                   (1 Point)  [ ] Hormonal therapy  or oral contraception   (1 Point)  [ ] History of pregnancy complications              (1 point)  [ ] Unexplained or recurrent              (1 Point)    OTHER RISK FACTORS                                           (1 Point)  [ ] BMI >40, smoking, diabetes requiring insulin, chemotherapy  blood transfusions and length of surgery over 2 hours    SURGERY RELATED RISK FACTORS  [ ]  Section within the last month     (1 Point)  [ ] Minor surgery                                                  (1 Point)  [ ] Arthroscopic surgery                                       (2 Points)  [ ] Planned major surgery lasting more            (2 Points)      than 45 minutes     [ ] Elective hip or knee joint replacement       (5 points)       surgery                                                TRAUMA RELATED RISK FACTORS  [ ] Fracture of the hip, pelvis, or leg                       (5 Points)  [ ] Spinal cord injury resulting in paralysis             (5 points)       (in the previous month)    [ ] Paralysis  (less than 1 month)                             (5 Points)  [ ] Multiple Trauma within 1 month                        (5 Points)    Total Score [        ]    Caprini Score 0-2: Low Risk, NO VTE prophylaxis required for most patients, encourage ambulation  Caprini Score 3-6: Moderate Risk , pharmacologic VTE prophylaxis is indicated for most patients (in the absence of contraindications)  Caprini Score Greater than or =7: High risk, pharmocologic VTE prophylaxis indicated for most patients (in the absence of contraindications)    OPIOID RISK TOOL    JEFFREY EACH BOX THAT APPLIES AND ADD TOTALS AT THE END    FAMILY HISTORY OF SUBSTANCE ABUSE                 FEMALE         MALE                                                Alcohol                             [  ]1 pt          [  ]3pts                                               Illegal Durgs                     [  ]2 pts        [  ]3pts                                               Rx Drugs                           [  ]4 pts        [  ]4 pts    PERSONAL HISTORY OF SUBSTANCE ABUSE                                                                                          Alcohol                             [  ]3 pts       [  ]3 pts                                               Illegal Drugs                     [  ]4 pts        [  ]4 pts                                               Rx Drugs                           [  ]5 pts        [  ]5 pts    AGE BETWEEN 16-45 YEARS                                      [  ]1 pt         [  ]1 pt    HISTORY OF PREADOLESCENT   SEXUAL ABUSE                                                             [  ]3 pts        [  ]0pts    PSYCHOLOGICAL DISEASE                     ADD, OCD, Bipolar, Schizophrenia        [  ]2 pts         [  ]2 pts                      Depression                                               [  ]1 pt           [  ]1 pt           SCORING TOTAL   (add numbers and type here)              (***)                                     A score of 3 or lower indicated LOW risk for future opioid abuse  A score of 4 to 7 indicated moderate risk for future opioid abuse  A score of 8 or higher indicates a high risk for opioid abuse     Patient educated on surgical scrub, preadmission instructions, ERP protocol,  and day of procedure medications, verbalizes understanding. Pt instructed to stop vitamins/supplements/herbal medications/ASA/NSAIDS for one week prior to surgery and discuss with PMD.     CAPRINI SCORE    AGE RELATED RISK FACTORS                                                             [ ] Age 41-60 years                                            (1 Point)  [ ] Age: 61-74 years                                           (2 Points)                 [ ] Age= 75 years                                                (3 Points)             DISEASE RELATED RISK FACTORS                                                       [ ] Edema in the lower extremities                 (1 Point)                     [ ] Varicose veins                                               (1 Point)                                 [ ] BMI > 25 Kg/m2                                            (1 Point)                                  [ ] Serious infection (ie PNA)                            (1 Point)                     [ ] Lung disease ( COPD, Emphysema)            (1 Point)                                                                          [ ] Acute myocardial infarction                         (1 Point)                  [ ] Congestive heart failure (in the previous month)  (1 Point)         [ ] Inflammatory bowel disease                            (1 Point)                  [ ] Central venous access, PICC or Port               (2 points)       (within the last month)                                                                [ ] Stroke (in the previous month)                        (5 Points)    [ ] Previous or present malignancy                       (2 points)                                                                                                                                                         HEMATOLOGY RELATED FACTORS                                                         [X ] Prior episodes of VTE                                     (3 Points)                     [ ] Positive family history for VTE                      (3 Points)                  [ ] Prothrombin 86877 A                                     (3 Points)                     [ ] Factor V Leiden                                                (3 Points)                        [ ] Lupus anticoagulants                                      (3 Points)                                                           [ ] Anticardiolipin antibodies                              (3 Points)                                                       [ ] High homocysteine in the blood                   (3 Points)                                             [ ] Other congenital or acquired thrombophilia      (3 Points)                                                [ ] Heparin induced thrombocytopenia                  (3 Points)                                        MOBILITY RELATED FACTORS  [ ] Bed rest                                                         (1 Point)  [ ] Plaster cast                                                    (2 points)  [ ] Bed bound for more than 72 hours           (2 Points)    GENDER SPECIFIC FACTORS  [ ] Pregnancy or had a baby within the last month   (1 Point)  [ ] Post-partum < 6 weeks                                   (1 Point)  [ ] Hormonal therapy  or oral contraception   (1 Point)  [ ] History of pregnancy complications              (1 point)  [ ] Unexplained or recurrent              (1 Point)    OTHER RISK FACTORS                                           (1 Point)  [ ] BMI >40, smoking, diabetes requiring insulin, chemotherapy  blood transfusions and length of surgery over 2 hours    SURGERY RELATED RISK FACTORS  [ ]  Section within the last month     (1 Point)  [ ] Minor surgery                                                  (1 Point)  [ ] Arthroscopic surgery                                       (2 Points)  [ ] Planned major surgery lasting more            (2 Points)      than 45 minutes     [ ] Elective hip or knee joint replacement       (5 points)       surgery                                                TRAUMA RELATED RISK FACTORS  [ ] Fracture of the hip, pelvis, or leg                       (5 Points)  [ ] Spinal cord injury resulting in paralysis             (5 points)       (in the previous month)    [ ] Paralysis  (less than 1 month)                             (5 Points)  [ ] Multiple Trauma within 1 month                        (5 Points)    Total Score [        ]    Caprini Score 0-2: Low Risk, NO VTE prophylaxis required for most patients, encourage ambulation  Caprini Score 3-6: Moderate Risk , pharmacologic VTE prophylaxis is indicated for most patients (in the absence of contraindications)  Caprini Score Greater than or =7: High risk, pharmocologic VTE prophylaxis indicated for most patients (in the absence of contraindications)    OPIOID RISK TOOL    JEFFREY EACH BOX THAT APPLIES AND ADD TOTALS AT THE END    FAMILY HISTORY OF SUBSTANCE ABUSE                 FEMALE         MALE                                                Alcohol                             [  ]1 pt          [  ]3pts                                               Illegal Durgs                     [  ]2 pts        [  ]3pts                                               Rx Drugs                           [  ]4 pts        [  ]4 pts    PERSONAL HISTORY OF SUBSTANCE ABUSE                                                                                          Alcohol                             [  ]3 pts       [  ]3 pts                                               Illegal Drugs                     [  ]4 pts        [  ]4 pts                                               Rx Drugs                           [  ]5 pts        [  ]5 pts    AGE BETWEEN 16-45 YEARS                                      [  ]1 pt         [  ]1 pt    HISTORY OF PREADOLESCENT   SEXUAL ABUSE                                                             [  ]3 pts        [  ]0pts    PSYCHOLOGICAL DISEASE                     ADD, OCD, Bipolar, Schizophrenia        [  ]2 pts         [  ]2 pts                      Depression                                               [  ]1 pt           [  ]1 pt           SCORING TOTAL   (add numbers and type here)              (***)                                     A score of 3 or lower indicated LOW risk for future opioid abuse  A score of 4 to 7 indicated moderate risk for future opioid abuse  A score of 8 or higher indicates a high risk for opioid abuse   44 y/o male with PMH of kidney stones and DVT right leg  presents to PST with complaints of left shoulder pain. States on 23 he was lifting a heavy tire causing his bicep to snap on his left arm/shoulder. Reports intermittent pain, described as dull, 4/10 in severity, worse with movement of left arm, relieved with rest. He takes NSAIDs as needed which helps the pain. Denies fevers, chills, nausea or vomiting. Patient is scheduled for left shoulder arthroscopy, extensive debridement, open subpectoral bicep tenodesis on 23 with Dr. Byrd. Patient educated on surgical scrub, preadmission instructions, ERP protocol and day of procedure medications, verbalizes understanding. Pt instructed to stop vitamins/supplements/herbal medications/ASA/NSAIDS for one week prior to surgery and discuss with PMD.     CAPRINI SCORE    AGE RELATED RISK FACTORS                                                             [x ] Age 41-60 years                                            (1 Point)  [ ] Age: 61-74 years                                           (2 Points)                 [ ] Age= 75 years                                                (3 Points)             DISEASE RELATED RISK FACTORS                                                       [ ] Edema in the lower extremities                 (1 Point)                     [x ] Varicose veins                                               (1 Point)                                 [x ] BMI > 25 Kg/m2                                            (1 Point)                                  [ ] Serious infection (ie PNA)                            (1 Point)                     [ ] Lung disease ( COPD, Emphysema)            (1 Point)                                                                          [ ] Acute myocardial infarction                         (1 Point)                  [ ] Congestive heart failure (in the previous month)  (1 Point)         [ ] Inflammatory bowel disease                            (1 Point)                  [ ] Central venous access, PICC or Port               (2 points)       (within the last month)                                                                [ ] Stroke (in the previous month)                        (5 Points)    [ ] Previous or present malignancy                       (2 points)                                                                                                                                                         HEMATOLOGY RELATED FACTORS                                                         [X ] Prior episodes of VTE                                     (3 Points)                     [ ] Positive family history for VTE                      (3 Points)                  [ ] Prothrombin 71806 A                                     (3 Points)                     [ ] Factor V Leiden                                                (3 Points)                        [ ] Lupus anticoagulants                                      (3 Points)                                                           [ ] Anticardiolipin antibodies                              (3 Points)                                                       [ ] High homocysteine in the blood                   (3 Points)                                             [ ] Other congenital or acquired thrombophilia      (3 Points)                                                [ ] Heparin induced thrombocytopenia                  (3 Points)                                        MOBILITY RELATED FACTORS  [ ] Bed rest                                                         (1 Point)  [ ] Plaster cast                                                    (2 points)  [ ] Bed bound for more than 72 hours           (2 Points)    GENDER SPECIFIC FACTORS  [ ] Pregnancy or had a baby within the last month   (1 Point)  [ ] Post-partum < 6 weeks                                   (1 Point)  [ ] Hormonal therapy  or oral contraception   (1 Point)  [ ] History of pregnancy complications              (1 point)  [ ] Unexplained or recurrent              (1 Point)    OTHER RISK FACTORS                                           (1 Point)  [ ] BMI >40, smoking, diabetes requiring insulin, chemotherapy  blood transfusions and length of surgery over 2 hours    SURGERY RELATED RISK FACTORS  [ ]  Section within the last month     (1 Point)  [ ] Minor surgery                                                  (1 Point)  [x ] Arthroscopic surgery                                       (2 Points)  [ ] Planned major surgery lasting more            (2 Points)      than 45 minutes     [ ] Elective hip or knee joint replacement       (5 points)       surgery                                                TRAUMA RELATED RISK FACTORS  [ ] Fracture of the hip, pelvis, or leg                       (5 Points)  [ ] Spinal cord injury resulting in paralysis             (5 points)       (in the previous month)    [ ] Paralysis  (less than 1 month)                             (5 Points)  [ ] Multiple Trauma within 1 month                        (5 Points)    Total Score [    8    ]    Caprini Score 0-2: Low Risk, NO VTE prophylaxis required for most patients, encourage ambulation  Caprini Score 3-6: Moderate Risk , pharmacologic VTE prophylaxis is indicated for most patients (in the absence of contraindications)  Caprini Score Greater than or =7: High risk, pharmocologic VTE prophylaxis indicated for most patients (in the absence of contraindications)    OPIOID RISK TOOL    JEFFREY EACH BOX THAT APPLIES AND ADD TOTALS AT THE END    FAMILY HISTORY OF SUBSTANCE ABUSE                 FEMALE         MALE                                                Alcohol                             [  ]1 pt          [  ]3pts                                               Illegal Durgs                     [  ]2 pts        [  ]3pts                                               Rx Drugs                           [  ]4 pts        [  ]4 pts    PERSONAL HISTORY OF SUBSTANCE ABUSE                                                                                          Alcohol                             [  ]3 pts       [  ]3 pts                                               Illegal Drugs                     [  ]4 pts        [  ]4 pts                                               Rx Drugs                           [  ]5 pts        [  ]5 pts    AGE BETWEEN 16-45 YEARS                                      [  ]1 pt         [x ]1 pt    HISTORY OF PREADOLESCENT   SEXUAL ABUSE                                                             [  ]3 pts        [  ]0pts    PSYCHOLOGICAL DISEASE                     ADD, OCD, Bipolar, Schizophrenia        [  ]2 pts         [  ]2 pts                      Depression                                               [  ]1 pt           [  ]1 pt           SCORING TOTAL   (add numbers and type here)              (*1**)                                     A score of 3 or lower indicated LOW risk for future opioid abuse  A score of 4 to 7 indicated moderate risk for future opioid abuse  A score of 8 or higher indicates a high risk for opioid abuse

## 2023-11-13 NOTE — H&P PST ADULT - NSICDXFAMILYHX_GEN_ALL_CORE_FT
FAMILY HISTORY:  FH: HTN (hypertension)    Mother  Still living? Unknown  Family history of diabetes mellitus (DM), Age at diagnosis: Age Unknown    Grandparent  Still living? Unknown  FH: CAD (coronary artery disease), Age at diagnosis: Age Unknown  FH: melanoma, Age at diagnosis: Age Unknown

## 2023-11-13 NOTE — H&P PST ADULT - PROBLEM SELECTOR PLAN 4
hx of Cocaine use, urine drug screen ordered for DOP, made patient aware if cocaine is present in urine procedure will be canceled, patient agrees and verbalized understanding.

## 2023-11-13 NOTE — H&P PST ADULT - NSCAGESTDRUGGUILT_GEN_A_CORE_SD
Incoming fax from [employer]    Date of result 11/18/2022 9:36 am EST    INR result 3.1    Aleksandra Rutledge RN    [facility]          no

## 2023-11-13 NOTE — H&P PST ADULT - NSANTHOSAYNRD_GEN_A_CORE
History     Chief Complaint   Patient presents with     Otalgia     left ear worse then right     Conjunctivitis     HPI  Lionel Chavez is a 39 year old male who presents to  for left ear pain and possible conjunctivitis of both eyes.  He reports having cold-like symptoms that started 3 days ago.  This morning when he woke up he had crusty discharge around both eyes.  He states both eyes were red.  No light sensitivity, denies any vision changes and no pain to both eyes.  He does not wear contact lenses or glasses. He has had left ear pain that also started today and states his hearing is muffled.  He denies any drainage from left ear.  No history of surgeries to this area.  He does report chronic sinus issues particularly affecting his left side.  He also reports nasal congestion.  Denies cough, shortness of breath, chest pain, fever, chills, nausea or vomiting.  He has not tried anything for his symptoms.    Allergies:  No Known Allergies    Problem List:    There are no active problems to display for this patient.       Past Medical History:    No past medical history on file.    Past Surgical History:    No past surgical history on file.    Family History:    No family history on file.    Social History:  Marital Status:  Single [1]  Social History     Tobacco Use     Smoking status: Never Smoker   Substance Use Topics     Alcohol use: Yes     Comment: occasional     Drug use: No        Medications:    amoxicillin-clavulanate (AUGMENTIN) 875-125 MG tablet          Review of Systems   Constitutional: Negative for chills and fever.   HENT: Positive for congestion, ear pain and hearing loss. Negative for ear discharge.    Eyes: Positive for discharge and redness. Negative for photophobia, pain and visual disturbance.   Respiratory: Negative for cough and shortness of breath.    Cardiovascular: Negative for chest pain.   Gastrointestinal: Negative for nausea and vomiting.   All other systems reviewed and are  negative.      Physical Exam   BP: 145/73  Heart Rate: 93  Temp: 97.9  F (36.6  C)  Resp: 18  SpO2: 98 %      Physical Exam  Vitals signs and nursing note reviewed.   Constitutional:       Appearance: He is ill-appearing. He is not toxic-appearing.   HENT:      Right Ear: Hearing, tympanic membrane and ear canal normal. There is no impacted cerumen.      Left Ear: External ear normal. Tenderness present. There is no impacted cerumen. No mastoid tenderness. Tympanic membrane is erythematous and bulging. Tympanic membrane is not perforated.      Nose: Congestion present.      Mouth/Throat:      Mouth: Mucous membranes are moist.      Tonsils: No tonsillar exudate or tonsillar abscesses.   Eyes:      General: Lids are normal.         Right eye: No discharge.         Left eye: No discharge.      Extraocular Movements: Extraocular movements intact.      Conjunctiva/sclera:      Right eye: Right conjunctiva is not injected. No exudate or hemorrhage.     Left eye: Left conjunctiva is not injected. No exudate or hemorrhage.     Pupils: Pupils are equal, round, and reactive to light.      Comments: Visual acuity 20/40 (right) and 20/400 (left)   Neck:      Musculoskeletal: Normal range of motion and neck supple.   Cardiovascular:      Rate and Rhythm: Normal rate and regular rhythm.      Heart sounds: Normal heart sounds.   Pulmonary:      Effort: Pulmonary effort is normal.      Breath sounds: Normal breath sounds. No wheezing or rhonchi.   Lymphadenopathy:      Cervical: No cervical adenopathy.   Skin:     General: Skin is warm and dry.      Capillary Refill: Capillary refill takes less than 2 seconds.   Neurological:      Mental Status: He is alert and oriented to person, place, and time.         ED Course        Procedures               No results found for this or any previous visit (from the past 24 hour(s)).    Medications   amoxicillin-clavulanate (AUGMENTIN) 875-125 MG per tablet 1 tablet (1 tablet Oral Given  4/11/20 1904)       Assessments & Plan (with Medical Decision Making)   Acute suppurative otitis media of left ear without spontaneous rupture of tympanic membrane:  Bulging erythematous left TM.  Left TM intact.  Right TM pearly gray.  No mastoid tenderness to left ear.  Patient is afebrile.  Vital signs are stable.  Respirations nonlabored with oxygen saturation 98% on room air.  Plan:  Augmentin as prescribed with first dose given in urgent care.  Take APAP/ibuprofen as needed for pain.  Can apply warm compress to the affected side of your ear.  Consider taking an oral decongestant to help with the nasal congestion.  Follow-up with your doctor in 2 weeks if no improvement in symptoms.  Return to ED/UC for worsening or concerning symptoms.    Acute conjunctivitis of both eyes:  Patient reports noticing redness to both eyes this morning.  He does report crusty type drainage to both eyes as well.  Very slight redness visualized to bilateral conjunctivae during this visit.  No discharge to both eyes.  PERRL and EOMs intact.  Visual acuity 20/40 (right); 20/400 (left)-patient denies any changes to his vision and is aware that he needs to get glasses.  He states his vision is normal.  Plan:  Offered to prescribe antibiotic drops for conjunctivitis, patient declined states he is more worried about his ear.  Discussed with patient to avoid touching eyes unnecessarily and make sure to wash hands if he touches his eyes.  Consider taking oral decongestant to help with the nasal congestion.  Drink plenty of fluids and get some rest.  Follow-up with PCP as needed if no improvement in symptoms.  Return to ED/UC for worsening or concerning symptoms.  Patient verbalized understanding.    Patient did have questions regarding whether his symptoms are consistent with covid19.  Discussed with patient that he does have viral-like symptoms but since his oxygen saturation is 98%, he is not having any trouble breathing and he is  afebrile he is stable enough to go home.  Discussed red flag symptoms for covid19 that may warrant him to come back in.  Advised him to wash hands, stay at home and avoid unnecessary contact with other people.  Patient verbalized understanding.    I have reviewed the nursing notes.    I have reviewed the findings, diagnosis, plan and need for follow up with the patient.      New Prescriptions    AMOXICILLIN-CLAVULANATE (AUGMENTIN) 875-125 MG TABLET    Take 1 tablet by mouth 2 times daily for 10 days       Final diagnoses:   Acute suppurative otitis media of left ear without spontaneous rupture of tympanic membrane   Acute conjunctivitis of both eyes       4/11/2020   [facility]     Mpofu, Prudence, CNP  04/11/20 1920       Mpofu, Prudence, CNP  04/11/20 1924     No. AMMON screening performed.  STOP BANG Legend: 0-2 = LOW Risk; 3-4 = INTERMEDIATE Risk; 5-8 = HIGH Risk

## 2023-11-13 NOTE — H&P PST ADULT - HISTORY OF PRESENT ILLNESS
42 y/o male with PMH of     Patient is scheduled for left shoulder arthroscopy, extensive debridement, open subpectoral bicep tenodesis on 11/16/23 with Dr. Byrd.  44 y/o male with PMH of kidney stones and DVT right leg 2022 presents to [facility] with complaints of left shoulder pain. States on 11/2/23 he was lifting a heavy tire causing his bicep to snap on his left arm/shoulder. Reports intermittent pain, described as dull, 4/10 in severity, worse with movement of left arm, relieved with rest. He takes NSAIDs as needed which helps the pain. Denies fevers, chills, nausea or vomiting. Patient is scheduled for left shoulder arthroscopy, extensive debridement, open subpectoral bicep tenodesis on 11/16/23 with Dr. Byrd.

## 2023-11-13 NOTE — H&P PST ADULT - PROBLEM SELECTOR PLAN 1
Patient is scheduled for left shoulder arthroscopy, extensive debridement, open subpectoral bicep tenodesis on 11/16/23 with Dr. Byrd.

## 2023-11-14 ENCOUNTER — APPOINTMENT (OUTPATIENT)
Dept: VASCULAR SURGERY | Facility: CLINIC | Age: 43
End: 2023-11-14
Payer: COMMERCIAL

## 2023-11-14 PROBLEM — N20.0 CALCULUS OF KIDNEY: Chronic | Status: ACTIVE | Noted: 2023-11-13

## 2023-11-14 PROBLEM — I82.401 ACUTE EMBOLISM AND THROMBOSIS OF UNSPECIFIED DEEP VEINS OF RIGHT LOWER EXTREMITY: Chronic | Status: ACTIVE | Noted: 2023-11-13

## 2023-11-14 PROCEDURE — 93970 EXTREMITY STUDY: CPT

## 2023-11-20 ENCOUNTER — APPOINTMENT (OUTPATIENT)
Dept: VASCULAR SURGERY | Facility: CLINIC | Age: 43
End: 2023-11-20
Payer: COMMERCIAL

## 2023-11-20 VITALS
HEART RATE: 85 BPM | DIASTOLIC BLOOD PRESSURE: 82 MMHG | TEMPERATURE: 98.2 F | SYSTOLIC BLOOD PRESSURE: 126 MMHG | WEIGHT: 195 LBS | RESPIRATION RATE: 14 BRPM | BODY MASS INDEX: 27.3 KG/M2 | HEIGHT: 71 IN | OXYGEN SATURATION: 97 %

## 2023-11-20 DIAGNOSIS — I82.431 ACUTE EMBOLISM AND THROMBOSIS OF RIGHT POPLITEAL VEIN: ICD-10-CM

## 2023-11-20 PROCEDURE — 99213 OFFICE O/P EST LOW 20 MIN: CPT

## 2023-11-20 RX ORDER — RIVAROXABAN 15 MG/1
15 TABLET, FILM COATED ORAL TWICE DAILY
Qty: 42 | Refills: 0 | Status: DISCONTINUED | COMMUNITY
Start: 2022-05-10 | End: 2023-11-20

## 2023-11-20 RX ORDER — DICLOFENAC SODIUM 1% 10 MG/G
1 GEL TOPICAL
Qty: 1 | Refills: 2 | Status: DISCONTINUED | COMMUNITY
Start: 2022-03-17 | End: 2023-11-20

## 2023-11-20 RX ORDER — RIVAROXABAN 20 MG/1
20 TABLET, FILM COATED ORAL
Qty: 90 | Refills: 3 | Status: DISCONTINUED | COMMUNITY
Start: 2022-05-10 | End: 2023-11-20

## 2023-11-20 RX ORDER — AMLODIPINE AND VALSARTAN 5; 160 MG/1; MG/1
5-160 TABLET, FILM COATED ORAL
Qty: 30 | Refills: 0 | Status: DISCONTINUED | COMMUNITY
Start: 2022-03-17 | End: 2023-11-20

## 2023-11-20 RX ORDER — VALSARTAN 160 MG/1
160 TABLET, COATED ORAL
Qty: 30 | Refills: 0 | Status: DISCONTINUED | COMMUNITY
Start: 2022-02-24 | End: 2023-11-20

## 2023-11-20 RX ORDER — OXYCODONE 5 MG/1
5 TABLET ORAL
Qty: 28 | Refills: 0 | Status: DISCONTINUED | COMMUNITY
Start: 2023-11-20 | End: 2023-11-20

## 2023-11-20 RX ORDER — ASPIRIN 325 MG/1
325 TABLET, FILM COATED ORAL DAILY
Qty: 30 | Refills: 0 | Status: DISCONTINUED | COMMUNITY
Start: 2022-03-30 | End: 2023-11-20

## 2023-11-20 RX ORDER — ROSUVASTATIN CALCIUM 10 MG/1
10 TABLET, FILM COATED ORAL
Qty: 30 | Refills: 0 | Status: DISCONTINUED | COMMUNITY
Start: 2022-03-17 | End: 2023-11-20

## 2023-11-21 ENCOUNTER — APPOINTMENT (OUTPATIENT)
Dept: ORTHOPEDIC SURGERY | Facility: AMBULATORY SURGERY CENTER | Age: 43
End: 2023-11-21
Payer: COMMERCIAL

## 2023-11-21 PROCEDURE — 23430 REPAIR BICEPS TENDON: CPT | Mod: LT

## 2023-11-21 PROCEDURE — 29822 SHO ARTHRS SRG LMTD DBRDMT: CPT | Mod: LT

## 2023-12-04 ENCOUNTER — APPOINTMENT (OUTPATIENT)
Dept: ORTHOPEDIC SURGERY | Facility: CLINIC | Age: 43
End: 2023-12-04
Payer: COMMERCIAL

## 2023-12-04 PROCEDURE — 99024 POSTOP FOLLOW-UP VISIT: CPT

## 2024-01-04 ENCOUNTER — APPOINTMENT (OUTPATIENT)
Dept: ORTHOPEDIC SURGERY | Facility: CLINIC | Age: 44
End: 2024-01-04
Payer: COMMERCIAL

## 2024-01-04 DIAGNOSIS — M25.512 PAIN IN LEFT SHOULDER: ICD-10-CM

## 2024-01-04 PROCEDURE — 99024 POSTOP FOLLOW-UP VISIT: CPT

## 2024-01-04 NOTE — PHYSICAL EXAM
[de-identified] : General: Awake, alert, no acute distress, Patient was cooperative and appropriate during the examination.  The patient is of normal weight for height and age.  Walks without an antalgic gait.  Full, painless range of motion of the neck and back.  Exam of the bilateral lower extremities is intact and symmetric with regards to dermatologic, vascular, and neurologic exam. Bilateral lower extremity sensation is grossly intact to light touch in the DP/SP/T/S/S nerve distributions. Intact DF/PF/EHL. BIlateral lower extremities warm and well-perfused with brisk capillary refill.   Pulmonary: Regular, nonlabored breathing  Abdomen: Soft, nontender, nondistended.  Lymphatic: No evidence of axillary lymphadenopathy  Left shoulder Exam: Physical exam of the shoulder demonstrates normal skin without signs of skin changes or abnormalities. No erythema, warmth, or joint effusion appreciated.  Mild soft tissue swelling.  A Carlos Enrique deformity is noted.  Sensation intact light touch C5-T1 Palpable radial pulse Radial/ulnar/median/axillary/musculocutaneous/AIN/PIN nerves grossly intact  Range of motion: Forward Flexion: 175 Abduction: 140 External Rotation: 45 Internal Rotation: L3  Palpation: Not tender to palpation over the glenohumeral joint Moderately tender palpation over the rotator cuff insertion on the greater tuberosity Not tender to palpation over the AC joint Exquisitely tender to palpation of the biceps tendon/bicipital groove  Strength testing: Supraspinatus:4+/5 Infraspinatus: 5/5 Subscapularis: 5/5  Special test: Empty can test positive Shipley impingement test positive Speeds test positive [place]'s test negative Lift-off test negative Bell-press test negative Cross-arm adduction test negative   [de-identified] : MRI of the right humerus and shoulder from a [facility] imaging facility on 11/8/2023 was reviewed with patient today in the office: -Acute full-thickness tear of the long head biceps tendon from its origin on the supraglenoid tubercle with retraction of fibers to the level of the proximal humeral diaphysis. Intramuscular and myofascial edema about the biceps muscle at the level the mid humerus. -Degenerative partial-thickness undersurface tearing along the superior to posterior superior glenoid labrum. -Fraying and partial thickness tearing along the humeral attachment of the inferior glenohumeral ligaments. -Mild subacromial/subdeltoid bursitis. -Mild tendinosis of the supraspinatus, infraspinatus, and subscapularis tendons without full-thickness or retracted tear.  X-rays including 4 views of the left shoulder were obtained in the office on 11/6/2023 and reviewed the patient.  There is no acute fracture or dislocation.  There is no significant arthritis.

## 2024-01-04 NOTE — DISCUSSION/SUMMARY
[de-identified] : Assessment: 43-year-old male with left shoulder pain secondary to a long head of the biceps rupture and rotator cuff tendinopathy  Plan: I had a long discussion with the patient today regarding the nature of their diagnosis and treatment plan.   I reviewed the patient's imaging with them today in the office which demonstrates a full-thickness rupture of the long head of the biceps tendon, degenerative partial tearing of the superior labrum, fraying and partial tearing of the inferior glenohumeral ligaments, mild subacromial bursitis and impingement, mild tendinosis of the rotator cuff without any full-thickness tear with. We discussed the risks and benefits of no treatment as well as nonoperative and operative treatments.  Nonsurgical treatments include resting, icing, heating, stretching, anti-inflammatory medicines as needed, activity modifications, home exercises, physical therapy, and possible cortisone injections.  The benefits of nonsurgical treatments are that they avoid the risks and recovery of surgery.  The disadvantages of nonsurgical treatments are that they may incompletely alleviate the patient's symptoms.  The patient has significant cosmetic concerns about the arm and would like this fixed.  I explained that the long head of the biceps tendon does not necessarily need to be repaired for functional purposes.  He is comfortable with this.  Surgical treatment would include a left shoulder arthroscopy with rotator cuff debridement, subacromial decompression, synovectomy and debridement, and possible open subpectoral biceps tenodesi for years now.  The risks and benefits of surgery were discussed in detail.  The benefits include improved shoulder pain and function.  The risks include but are not limited to infection, blood loss, blood clots, neurovascular injury, stiffness/loss of range of motion, fracture, failure of surgery, persistent pain, failure of hardware, painful hardware, anesthesia related complications including paralysis and death, and the need for further surgery in the future.  Postoperatively the patient will be nonweightbearing in a sling for 2 to 4 weeks depending on the procedures performed.  Physical therapy will start 2 weeks after surgery and will be for 2 to 3 days a week for the duration of the recovery.  Expect most patients to return to unrestricted activity 3 months after surgery although some may take longer to fully recover.  The patient will also not be permitted to drive while wearing a sling or taking narcotic pain medications. The patient verbalizes their understanding of all surgical risks as well as the anticipated postoperative course.  [They will speak with my surgical coordinator regarding presurgical testing, medical clearances, and scheduling the procedure.    The patient verbalizes their understanding and agrees with the plan. All questions were answered to their satisfaction.

## 2024-01-04 NOTE — HISTORY OF PRESENT ILLNESS
[___ Weeks Post Op] : [unfilled] weeks post op [de-identified] : DOS: 11/21/2023 Status post left shoulder arthroscopy, debridement, synovectomy and subpectoral open biceps tenodesis [de-identified] : DOS: 11/21/2023 Patient is a 43-year-old male status post left shoulder arthroscopy, debridement, synovectomy and subpectoral open biceps tenodesis.  Patient states he is doing well and his range of motion, pain, function is improving.  He states physical therapy is helpful.  He denies any numbness or tingling distally.  He has no other complaints today. [de-identified] : On exam, the patient is pleasant.  They  are awake, alert, and oriented x3.  The patient presents today with no assistive devices.  Weight is appropriate for height Full range of motion of cervical spine without instability Full range of motion of back without instability Intact neurologic, vascular, and dermatologic exam to right and left upper extremities Intact neurologic, vascular, and dermatologic exam to right and left lower extremities  Left upper Extremity The patient's incisions are well approximated and healing well. No erythema, warmth, or drainage. There is mild swelling.  The incisions are clean, dry and intact without any evidence of infection.  There was 1 retained Vicryl suture over the proximal aspect of the biceps incision that was removed and the wounds were cleansed with alcohol.  There was no evidence of infection.  No bony tenderness to palpation about the shoulder. Active range of motion: Flexion 180, abduction: 150, external rotation: 50, internal rotation: Lower lumbar level.  Strength testing: Not tested today. Motor and sensory function is intact, sensations intact light touch in C5-T1 distributions, DP/PT pulses are palpable, compartments are soft and compressible, there is no calf tenderness to palpation bilaterally. [de-identified] : DOS: 11/21/2023 Patient is a 43-year-old male status post left shoulder arthroscopy, debridement, synovectomy and subpectoral open biceps tenodesis. [de-identified] : Patient is recovering well from their recent surgery.  They have had improvements in their pain, swelling, and range of motion since the day of surgery.  At this time he will continue with physical therapy to continue to follow the postoperative protocol previously provided.  He will avoid any heavy lifting, pushing or pulling with the left upper extremity and will gradually increase activities as per the protocol.  He will follow-up in 6 weeks for repeat evaluation to reassess.  Patient understands and agrees and all questions were answered.  Patient discussed and reviewed with Dr. Byrd today.

## 2024-03-04 ENCOUNTER — APPOINTMENT (OUTPATIENT)
Dept: ORTHOPEDIC SURGERY | Facility: CLINIC | Age: 44
End: 2024-03-04

## 2024-05-12 ENCOUNTER — NON-APPOINTMENT (OUTPATIENT)
Age: 44
End: 2024-05-12

## 2025-06-04 ENCOUNTER — EMERGENCY (EMERGENCY)
Facility: HOSPITAL | Age: 45
LOS: 1 days | End: 2025-06-04
Attending: STUDENT IN AN ORGANIZED HEALTH CARE EDUCATION/TRAINING PROGRAM
Payer: COMMERCIAL

## 2025-06-04 ENCOUNTER — EMERGENCY (EMERGENCY)
Facility: HOSPITAL | Age: 45
LOS: 1 days | End: 2025-06-04
Attending: EMERGENCY MEDICINE | Admitting: EMERGENCY MEDICINE
Payer: COMMERCIAL

## 2025-06-04 VITALS
SYSTOLIC BLOOD PRESSURE: 149 MMHG | HEART RATE: 92 BPM | TEMPERATURE: 98 F | DIASTOLIC BLOOD PRESSURE: 78 MMHG | OXYGEN SATURATION: 98 % | HEIGHT: 71 IN | RESPIRATION RATE: 16 BRPM | WEIGHT: 190.04 LBS

## 2025-06-04 VITALS
DIASTOLIC BLOOD PRESSURE: 80 MMHG | HEART RATE: 79 BPM | OXYGEN SATURATION: 98 % | TEMPERATURE: 98 F | RESPIRATION RATE: 18 BRPM | SYSTOLIC BLOOD PRESSURE: 136 MMHG

## 2025-06-04 VITALS
OXYGEN SATURATION: 99 % | RESPIRATION RATE: 15 BRPM | HEART RATE: 88 BPM | TEMPERATURE: 99 F | SYSTOLIC BLOOD PRESSURE: 132 MMHG | DIASTOLIC BLOOD PRESSURE: 78 MMHG

## 2025-06-04 VITALS
TEMPERATURE: 98 F | RESPIRATION RATE: 20 BRPM | OXYGEN SATURATION: 100 % | HEART RATE: 90 BPM | SYSTOLIC BLOOD PRESSURE: 162 MMHG | WEIGHT: 186.29 LBS | DIASTOLIC BLOOD PRESSURE: 96 MMHG

## 2025-06-04 DIAGNOSIS — Z98.890 OTHER SPECIFIED POSTPROCEDURAL STATES: Chronic | ICD-10-CM

## 2025-06-04 LAB
ALBUMIN SERPL ELPH-MCNC: 4 G/DL — SIGNIFICANT CHANGE UP (ref 3.3–5.2)
ALP SERPL-CCNC: 98 U/L — SIGNIFICANT CHANGE UP (ref 40–120)
ALT FLD-CCNC: 18 U/L — SIGNIFICANT CHANGE UP
ANION GAP SERPL CALC-SCNC: 13 MMOL/L — SIGNIFICANT CHANGE UP (ref 5–17)
APTT BLD: 28.2 SEC — SIGNIFICANT CHANGE UP (ref 26.1–36.8)
AST SERPL-CCNC: 27 U/L — SIGNIFICANT CHANGE UP
BASOPHILS # BLD AUTO: 0.07 K/UL — SIGNIFICANT CHANGE UP (ref 0–0.2)
BASOPHILS NFR BLD AUTO: 1 % — SIGNIFICANT CHANGE UP (ref 0–2)
BILIRUB SERPL-MCNC: 0.2 MG/DL — LOW (ref 0.4–2)
BLD GP AB SCN SERPL QL: SIGNIFICANT CHANGE UP
BUN SERPL-MCNC: 9.8 MG/DL — SIGNIFICANT CHANGE UP (ref 8–20)
CALCIUM SERPL-MCNC: 8.7 MG/DL — SIGNIFICANT CHANGE UP (ref 8.4–10.5)
CHLORIDE SERPL-SCNC: 107 MMOL/L — SIGNIFICANT CHANGE UP (ref 96–108)
CO2 SERPL-SCNC: 24 MMOL/L — SIGNIFICANT CHANGE UP (ref 22–29)
CREAT SERPL-MCNC: 0.82 MG/DL — SIGNIFICANT CHANGE UP (ref 0.5–1.3)
EGFR: 110 ML/MIN/1.73M2 — SIGNIFICANT CHANGE UP
EGFR: 110 ML/MIN/1.73M2 — SIGNIFICANT CHANGE UP
EOSINOPHIL # BLD AUTO: 0.19 K/UL — SIGNIFICANT CHANGE UP (ref 0–0.5)
EOSINOPHIL NFR BLD AUTO: 2.7 % — SIGNIFICANT CHANGE UP (ref 0–6)
GLUCOSE SERPL-MCNC: 126 MG/DL — HIGH (ref 70–99)
HCT VFR BLD CALC: 48.7 % — SIGNIFICANT CHANGE UP (ref 39–50)
HGB BLD-MCNC: 15.8 G/DL — SIGNIFICANT CHANGE UP (ref 13–17)
IMM GRANULOCYTES # BLD AUTO: 0.03 K/UL — SIGNIFICANT CHANGE UP (ref 0–0.07)
IMM GRANULOCYTES NFR BLD AUTO: 0.4 % — SIGNIFICANT CHANGE UP (ref 0–0.9)
INR BLD: 0.88 RATIO — SIGNIFICANT CHANGE UP (ref 0.85–1.16)
LYMPHOCYTES # BLD AUTO: 1.07 K/UL — SIGNIFICANT CHANGE UP (ref 1–3.3)
LYMPHOCYTES NFR BLD AUTO: 15.2 % — SIGNIFICANT CHANGE UP (ref 13–44)
MCHC RBC-ENTMCNC: 31.7 PG — SIGNIFICANT CHANGE UP (ref 27–34)
MCHC RBC-ENTMCNC: 32.4 G/DL — SIGNIFICANT CHANGE UP (ref 32–36)
MCV RBC AUTO: 97.8 FL — SIGNIFICANT CHANGE UP (ref 80–100)
MONOCYTES # BLD AUTO: 0.74 K/UL — SIGNIFICANT CHANGE UP (ref 0–0.9)
MONOCYTES NFR BLD AUTO: 10.5 % — SIGNIFICANT CHANGE UP (ref 2–14)
NEUTROPHILS # BLD AUTO: 4.96 K/UL — SIGNIFICANT CHANGE UP (ref 1.8–7.4)
NEUTROPHILS NFR BLD AUTO: 70.2 % — SIGNIFICANT CHANGE UP (ref 43–77)
NRBC # BLD AUTO: 0 K/UL — SIGNIFICANT CHANGE UP (ref 0–0)
NRBC # FLD: 0 K/UL — SIGNIFICANT CHANGE UP (ref 0–0)
NRBC BLD AUTO-RTO: 0 /100 WBCS — SIGNIFICANT CHANGE UP (ref 0–0)
PLATELET # BLD AUTO: 221 K/UL — SIGNIFICANT CHANGE UP (ref 150–400)
PMV BLD: 8.9 FL — SIGNIFICANT CHANGE UP (ref 7–13)
POTASSIUM SERPL-MCNC: 4 MMOL/L — SIGNIFICANT CHANGE UP (ref 3.5–5.3)
POTASSIUM SERPL-SCNC: 4 MMOL/L — SIGNIFICANT CHANGE UP (ref 3.5–5.3)
PROT SERPL-MCNC: 6.3 G/DL — LOW (ref 6.6–8.7)
PROTHROM AB SERPL-ACNC: 10 SEC — SIGNIFICANT CHANGE UP (ref 9.9–13.4)
RBC # BLD: 4.98 M/UL — SIGNIFICANT CHANGE UP (ref 4.2–5.8)
RBC # FLD: 14.2 % — SIGNIFICANT CHANGE UP (ref 10.3–14.5)
SODIUM SERPL-SCNC: 143 MMOL/L — SIGNIFICANT CHANGE UP (ref 135–145)
WBC # BLD: 7.06 K/UL — SIGNIFICANT CHANGE UP (ref 3.8–10.5)
WBC # FLD AUTO: 7.06 K/UL — SIGNIFICANT CHANGE UP (ref 3.8–10.5)

## 2025-06-04 PROCEDURE — 80053 COMPREHEN METABOLIC PANEL: CPT

## 2025-06-04 PROCEDURE — 86901 BLOOD TYPING SEROLOGIC RH(D): CPT

## 2025-06-04 PROCEDURE — 70487 CT MAXILLOFACIAL W/DYE: CPT

## 2025-06-04 PROCEDURE — 99285 EMERGENCY DEPT VISIT HI MDM: CPT

## 2025-06-04 PROCEDURE — 85610 PROTHROMBIN TIME: CPT

## 2025-06-04 PROCEDURE — 86900 BLOOD TYPING SEROLOGIC ABO: CPT

## 2025-06-04 PROCEDURE — 99285 EMERGENCY DEPT VISIT HI MDM: CPT | Mod: 25

## 2025-06-04 PROCEDURE — 86850 RBC ANTIBODY SCREEN: CPT

## 2025-06-04 PROCEDURE — 96375 TX/PRO/DX INJ NEW DRUG ADDON: CPT

## 2025-06-04 PROCEDURE — 70487 CT MAXILLOFACIAL W/DYE: CPT | Mod: 26

## 2025-06-04 PROCEDURE — 96374 THER/PROPH/DIAG INJ IV PUSH: CPT

## 2025-06-04 PROCEDURE — 85730 THROMBOPLASTIN TIME PARTIAL: CPT

## 2025-06-04 PROCEDURE — 36415 COLL VENOUS BLD VENIPUNCTURE: CPT

## 2025-06-04 PROCEDURE — 85025 COMPLETE CBC W/AUTO DIFF WBC: CPT

## 2025-06-04 RX ORDER — AMPICILLIN SODIUM AND SULBACTAM SODIUM 1; .5 G/1; G/1
3 INJECTION, POWDER, FOR SOLUTION INTRAMUSCULAR; INTRAVENOUS ONCE
Refills: 0 | Status: COMPLETED | OUTPATIENT
Start: 2025-06-04 | End: 2025-06-04

## 2025-06-04 RX ORDER — OXYCODONE HYDROCHLORIDE AND ACETAMINOPHEN 10; 325 MG/1; MG/1
1 TABLET ORAL
Qty: 6 | Refills: 0
Start: 2025-06-04 | End: 2025-06-05

## 2025-06-04 RX ORDER — AMOXICILLIN AND CLAVULANATE POTASSIUM 500; 125 MG/1; MG/1
1 TABLET, FILM COATED ORAL
Qty: 14 | Refills: 0
Start: 2025-06-04 | End: 2025-06-10

## 2025-06-04 RX ORDER — ACETAMINOPHEN 500 MG/5ML
1000 LIQUID (ML) ORAL ONCE
Refills: 0 | Status: COMPLETED | OUTPATIENT
Start: 2025-06-04 | End: 2025-06-04

## 2025-06-04 RX ORDER — KETOROLAC TROMETHAMINE 30 MG/ML
15 INJECTION, SOLUTION INTRAMUSCULAR; INTRAVENOUS ONCE
Refills: 0 | Status: DISCONTINUED | OUTPATIENT
Start: 2025-06-04 | End: 2025-06-04

## 2025-06-04 RX ADMIN — AMPICILLIN SODIUM AND SULBACTAM SODIUM 200 GRAM(S): 1; .5 INJECTION, POWDER, FOR SOLUTION INTRAMUSCULAR; INTRAVENOUS at 03:23

## 2025-06-04 RX ADMIN — Medication 400 MILLIGRAM(S): at 11:11

## 2025-06-04 RX ADMIN — KETOROLAC TROMETHAMINE 15 MILLIGRAM(S): 30 INJECTION, SOLUTION INTRAMUSCULAR; INTRAVENOUS at 03:23

## 2025-06-04 RX ADMIN — AMPICILLIN SODIUM AND SULBACTAM SODIUM 200 GRAM(S): 1; .5 INJECTION, POWDER, FOR SOLUTION INTRAMUSCULAR; INTRAVENOUS at 11:13

## 2025-06-04 RX ADMIN — Medication 1000 MILLILITER(S): at 11:13

## 2025-06-04 NOTE — ED PROVIDER NOTE - PROGRESS NOTE DETAILS
Paged dental for second time pending callback Patient to be seen by dental.  They were aware of the patient. pt evaluated by dental, sp I&D, recommend dc w po augmentin and OTC pain med. pt has an appt w his dentist in two days. strict return instructions given.

## 2025-06-04 NOTE — ED ADULT NURSE NOTE - OBJECTIVE STATEMENT
pt c/o pain and swelling to right jaw area x2 weeks. pt states symptoms worsened this AM and pain awoke him from sleep. pt endorses having toothache for 2 weeks now and has not seen any dentist/MD for it. pt denies fevers/chills. Meds given as per MD orders.

## 2025-06-04 NOTE — ED PROVIDER NOTE - CLINICAL SUMMARY MEDICAL DECISION MAKING FREE TEXT BOX
Plan keep n.p.o. until dental evaluation, IV fluids, pain meds redosed Unasyn and reassess.  Plan discussed with patient.  Page dental pending callback

## 2025-06-04 NOTE — ED PROVIDER NOTE - CHIEF COMPLAINT
Patient wants to confirm his dose for Tresiba   Per MAR  insulin degludec (TRESIBA FLEXTOUCH) 100 UNIT/ML pen-injector 45 mL 3 11/30/2023 11/29/2024    Sig - Route: Inject 40 Units into the skin daily. Prime 2 units before each dose. - Subcutaneous        Patient does not recall from his visit yesterday that this is dose he needs to take, states you told him to stop Lantus 36 units since his sugar \" goes low\"    Please verify dose    The patient is a 45y Male complaining of

## 2025-06-04 NOTE — ED ADULT NURSE REASSESSMENT NOTE - NS ED NURSE REASSESS COMMENT FT1
Received report from SARA renner and assumed care of pt. Pt is AxOx3 breathing even and unlabored, walks with steady gait. Pt awaiting transfer to [facility]

## 2025-06-04 NOTE — ED PROVIDER NOTE - PHYSICAL EXAMINATION
Gen: Nontoxic, well appearing, in NAD.  Skin: Warm and dry as visualized.  Head: NC/AT.  Eyes: PERRLA. EOMI.  Mouth/Throat: Airway patent. Tolerating secretions, no drooling or trismus. Poor dentition. Missing and cracked tooth to RLQ. Tender area of swelling/fullness to gingivae of RLQ. No obvious fluctuance, but exam limited as patient has difficulty tolerating exam. No swelling to floor of mouth to suggest Jorge Angina. External swelling to right jaw extending slightly below jawline.   Neck: Supple, FROM. Trachea midline.   Resp: No distress.  Cardio: Well perfused.  Ext: No deformities. MAEx4. FROM.   Neuro: A&Ox3. Appears nonfocal.   Psych: Normal affect and mood.

## 2025-06-04 NOTE — ED ADULT TRIAGE NOTE - CHIEF COMPLAINT QUOTE
From home c/o Right jaw swelling & pain started as toothache 2 weeks ago then this morning patient said "I woke up like this"

## 2025-06-04 NOTE — ED PROVIDER NOTE - ATTENDING APP SHARED VISIT CONTRIBUTION OF CARE
45-year-old male with no reported PMH presenting with right facial swelling.  Patient reports having a dental infection on the right side for the last 2 weeks.  Did not seek medical attention has not seen a dentist.  Yesterday he woke up with right sided facial swelling and pain despite taking ibuprofen.  Denies trouble swallowing, controlling secretions, difficulty swallowing.  No fevers.  On exam patient is nontoxic-appearing,  afebrile, HDS, head NC/AT, EOMI, no stridor, no tongue elevation, no pooling of secretions, marked swelling over right jaw, tenderness to palpation over right jawline.  Low suspicion for Jorge given exam.  However patient has marked swelling warranting CT to evaluate for deep soft tissue infection.  Labs and antibiotics ordered.  Dispo pending CT scan results.

## 2025-06-04 NOTE — ED PROVIDER NOTE - NSFOLLOWUPINSTRUCTIONS_ED_ALL_ED_FT
Please follow up with your dentist in two days. Return to ED for any worsening of symptoms.     Dental Pain    Dental pain (toothache) may be caused by many things including tooth decay (cavities or caries), abscess or infection, injury, or the reason may be unknown. Your pain may only occur when you are chewing, are exposed to hot or cold temperature, are eating or drinking sugary foods or beverages, or your pain may be constant. If you were prescribed an antibiotic medicine, finish all of it even if you start to feel better. Rinsing your mouth with salt water or applying ice to the painful area of your face may help with the pain.    SEEK IMMEDIATE MEDICAL CARE IF YOU HAVE THE FOLLOWING SYMPTOMS: unable to open mouth, trouble breathing or swallowing, fever, or swelling of the face, neck or jaw.

## 2025-06-04 NOTE — ED PROVIDER NOTE - PHYSICAL EXAMINATION
Patient ANO x 3 laying in bed no apparent distress no drooling no stridor.  Answering questions in full sentences.  Noticeable swelling to the right lower mandible with no overlying redness.  Tender to palpation.  Poor dentition.  Moist mucous membranes.  No work of breathing clear lungs bilaterally.

## 2025-06-04 NOTE — ED ADULT TRIAGE NOTE - CHIEF COMPLAINT QUOTE
transfer from [facility] for dental consult, 2 weeks of R sided jaw pain, swelling, possible abscess. was given unasyn and toradol with some relief. no medical history.

## 2025-06-04 NOTE — CONSULT NOTE ADULT - SUBJECTIVE AND OBJECTIVE BOX
Patient is a 45y old  Male who presents with a chief complaint of left sided facial swelling. Dental consulted to evaluate dental abscess.     HPI: 45-year-old male with no known medical history presented with right-sided facial swelling, attributed to a 2-week history of untreated dental infection. Patient denied fever but reported swelling onset the day prior. Physical exam revealed right mandibular swelling with gingival tenderness and poor dentition. No signs of airway compromise, Jorge angina, or trismus initially noted, though mild trismus developed during ED stay. CT maxillofacial with contrast revealed a 3 cm ill-defined subperiosteal abscess adjacent to the right mandible with surrounding inflammation, consistent with an odontogenic infection. Patient received IV Unasyn and ketorolac.      PAST MEDICAL & SURGICAL HISTORY:  Kidney stone      Right leg DVT      H/O lithotripsy        ( -  ) heart valve replacement  ( -  ) joint replacement  ( -  ) pregnancy    MEDICATIONS  (STANDING):    MEDICATIONS  (PRN):      Allergies    No Known Allergies    Intolerances        FAMILY HISTORY:  FH: HTN (hypertension)    FH: melanoma (Grandparent)    FH: CAD (coronary artery disease) (Grandparent)    Family history of diabetes mellitus (DM) (Mother)        *SOCIAL HISTORY: (  - ) Tobacco; ( -  ) ETOH    *Last Dental Visit: Years ago, patient was told that he needed multiple root canals but did not have then done     Vital Signs Last 24 Hrs  T(C): 37.2 (04 Jun 2025 13:06), Max: 37.2 (04 Jun 2025 13:06)  T(F): 99 (04 Jun 2025 13:06), Max: 99 (04 Jun 2025 13:06)  HR: 88 (04 Jun 2025 13:06) (79 - 92)  BP: 132/78 (04 Jun 2025 13:06) (124/82 - 162/96)  BP(mean): --  RR: 15 (04 Jun 2025 13:06) (15 - 20)  SpO2: 99% (04 Jun 2025 13:06) (97% - 100%)    Parameters below as of 04 Jun 2025 13:06  Patient On (Oxygen Delivery Method): room air        LABS:                        15.8   7.06  )-----------( 221      ( 04 Jun 2025 03:20 )             48.7     06-04    143  |  107  |  9.8  ----------------------------<  126[H]  4.0   |  24.0  |  0.82    Ca    8.7      04 Jun 2025 03:20    TPro  6.3[L]  /  Alb  4.0  /  TBili  0.2[L]  /  DBili  x   /  AST  27  /  ALT  18  /  AlkPhos  98  06-04    WBC Count: 7.06 K/uL [3.80 - 10.50] (06-04 @ 03:20)  Platelet Count - Automated: 221 K/uL [150 - 400] (06-04 @ 03:20)  INR: 0.88 ratio [0.85 - 1.16] (06-04 @ 03:20)    Urinalysis Basic - ( 04 Jun 2025 03:20 )    Color: x / Appearance: x / SG: x / pH: x  Gluc: 126 mg/dL / Ketone: x  / Bili: x / Urobili: x   Blood: x / Protein: x / Nitrite: x   Leuk Esterase: x / RBC: x / WBC x   Sq Epi: x / Non Sq Epi: x / Bacteria: x        PT/INR - ( 04 Jun 2025 03:20 )   PT: 10.0 sec;   INR: 0.88 ratio         PTT - ( 04 Jun 2025 03:20 )  PTT:28.2 sec  EOE:  TMJ (  - ) clicks                     ( -  ) pops                     (  - ) crepitus             Mandible FROM             Facial bones and MOM grossly intact             ( -  ) trismus             (  - ) lymphadenopathy             (  + ) swelling: Right mandibular swelling, does not extend beyond the border of the mandible, able to palpate the border of the mandible              (  + ) asymmetry: right mandibular facial swelling             ( +  ) palpation: significant pain on palpation of the lower right chin              (  - ) dyspnea             ( -  ) dysphagia             ( -  ) loss of consciousness    IOE:  Permanent dentition, multiple missing teeth, multiple carious teeth            hard/soft palate:  ( -  ) palatal torus, No pathology noted           tongue/FOM No pathology noted           labial/buccal mucosa No pathology noted           ( +  ) percussion: Pain on percussion of tooth #27 and #30.            (  + ) palpation: pain of palpation of lower left buccal vestibule adjacent to tooth #30           ( +  ) swelling : Fluctuant swelling of lower left buccal vestibule            ( +  ) abscess: associated with tooth #30           (  - ) sinus tract    Dentition present:   #27: intact  Edentulous site #28-29  #30: missing occlusal composite extending to the pulp  #31: crown         *DENTAL RADIOGRAPHS: Mandibular left molar PA captured and interpreted: Tooth #30 missing occlusal composite restoration, slight widening of PDL of mesial root. #31 crown with RCT post and core.     RADIOLOGY & ADDITIONAL STUDIES: Maxillofacial CT with IV contrast. Impression: Prominent periodontal disease with Ill-defined subperiosteal abscess adjacent to the right mandible measuring up to approximately 3 cm in depth with prominent surrounding inflammatory change.    *ASSESSMENT: Tooth #30 missing occlusal composite encroaching on pulp causing dental abscess      *PLAN: Incision and drainage of lower left buccal vestibule under local anesthesia     PROCEDURE:   Verbal and written consent obtained and placed into patient's chart. Anesthesia: Left SHALONDA, long buccal and lingual block administered with 1 carpule of 2% Lidocaine with Epinephrine 1:100K, local infiltration with 3 carpules of 4% Septocaine with Epinephrine 1:100K. Anesthesia achieved. Incision made from edentulous side #28 to distal of #30. Soft tissue dissection with hemostat, hemopurulent exudate appreciated. Copious sterile saline irrigation. Penrose drain placed and sutured in place with 1 3-0 silk suture. Patient tolerated procedure well. PO instructions given. Augmentin prescription and OTC pain medication recommended. Soft diet, no small food particles. Patient aware that drain must be removed in 2 days by outside dentist.      RECOMMENDATIONS:  1) Po augmentin and pain medication   2) Dental F/U with outpatient dentist for drain removal in 2 days  3) If any swelling worsens, difficulty swallowing or breathing, return to ED    Raisa Alves DDS #[id number]

## 2025-06-04 NOTE — ED PROVIDER NOTE - OBJECTIVE STATEMENT
44 yo male no PMHx presents to ED c/o right sided facial swelling. Patient reports has had a dental infection for the last two weeks, but "ignored" it. Woke up yesterday with facial swelling. Last medicated for ibuprofen yesterday morning. Has not followed up as outpatient. No other complaints at this time.   Denies fevers.

## 2025-06-04 NOTE — ED PROVIDER NOTE - CLINICAL SUMMARY MEDICAL DECISION MAKING FREE TEXT BOX
44 yo male no PMHx presents to ED c/o right sided facial swelling 2/2 dental infection. 44 yo male no PMHx presents to ED c/o right sided facial swelling 2/2 dental infection. CT with subperiosteal abscess. 46 yo male no PMHx presents to ED c/o right sided facial swelling 2/2 dental infection. CT with subperiosteal abscess. Received IV abx. Spoke with on call dental resident, patient to be transferred to [facility] for further management. Patient agreeable.

## 2025-06-04 NOTE — ED PROVIDER NOTE - PROGRESS NOTE DETAILS
ZULAY Ewing: Results reviewed with patient. Having some trismus. Case discussed with on call dental resident Dr. Nilesh Porter. Awaiting call back.

## 2025-06-04 NOTE — ED PROVIDER NOTE - PATIENT PORTAL LINK FT
You can access the FollowMyHealth Patient Portal offered by [facility] by registering at the following website: [email or web address]. By joining [email or web address] FollowMyHealth portal, you will also be able to view your health information using other applications (apps) compatible with our system.
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
## 2025-06-06 DIAGNOSIS — K00.0 ANODONTIA: ICD-10-CM

## 2025-06-06 DIAGNOSIS — R22.0 LOCALIZED SWELLING, MASS AND LUMP, HEAD: ICD-10-CM

## 2025-06-06 DIAGNOSIS — K03.81 CRACKED TOOTH: ICD-10-CM

## 2025-06-06 DIAGNOSIS — M27.2 INFLAMMATORY CONDITIONS OF JAWS: ICD-10-CM

## 2025-08-27 ENCOUNTER — EMERGENCY (EMERGENCY)
Facility: HOSPITAL | Age: 45
LOS: 1 days | End: 2025-08-27
Attending: EMERGENCY MEDICINE
Payer: COMMERCIAL

## 2025-08-27 VITALS
TEMPERATURE: 99 F | DIASTOLIC BLOOD PRESSURE: 84 MMHG | HEIGHT: 71 IN | SYSTOLIC BLOOD PRESSURE: 122 MMHG | HEART RATE: 92 BPM | OXYGEN SATURATION: 100 % | WEIGHT: 182.98 LBS | RESPIRATION RATE: 18 BRPM

## 2025-08-27 DIAGNOSIS — Z98.890 OTHER SPECIFIED POSTPROCEDURAL STATES: Chronic | ICD-10-CM

## 2025-08-27 PROCEDURE — 99281 EMR DPT VST MAYX REQ PHY/QHP: CPT

## 2025-08-27 PROCEDURE — L9991: CPT

## 2025-08-28 ENCOUNTER — NON-APPOINTMENT (OUTPATIENT)
Age: 45
End: 2025-08-28